# Patient Record
Sex: FEMALE | Race: WHITE | Employment: UNEMPLOYED | URBAN - METROPOLITAN AREA
[De-identification: names, ages, dates, MRNs, and addresses within clinical notes are randomized per-mention and may not be internally consistent; named-entity substitution may affect disease eponyms.]

---

## 2017-06-09 ENCOUNTER — OFFICE VISIT (OUTPATIENT)
Dept: OBGYN CLINIC | Age: 47
End: 2017-06-09

## 2017-06-09 VITALS
BODY MASS INDEX: 28.53 KG/M2 | SYSTOLIC BLOOD PRESSURE: 120 MMHG | DIASTOLIC BLOOD PRESSURE: 76 MMHG | HEIGHT: 63 IN | WEIGHT: 161 LBS

## 2017-06-09 DIAGNOSIS — R31.9 HEMATURIA: ICD-10-CM

## 2017-06-09 DIAGNOSIS — R10.2 PELVIC PRESSURE IN FEMALE: ICD-10-CM

## 2017-06-09 DIAGNOSIS — R30.0 BURNING WITH URINATION: ICD-10-CM

## 2017-06-09 DIAGNOSIS — Z01.411 ENCOUNTER FOR GYNECOLOGICAL EXAMINATION (GENERAL) (ROUTINE) WITH ABNORMAL FINDINGS: Primary | ICD-10-CM

## 2017-06-09 LAB
BILIRUB UR QL STRIP: NEGATIVE
GLUCOSE UR-MCNC: NEGATIVE MG/DL
KETONES P FAST UR STRIP-MCNC: NORMAL MG/DL
PH UR STRIP: 4.6 [PH] (ref 4.6–8)
PROT UR QL STRIP: NEGATIVE MG/DL
SP GR UR STRIP: 1 (ref 1–1.03)
UA UROBILINOGEN AMB POC: NORMAL (ref 0.2–1)
URINALYSIS CLARITY POC: CLEAR
URINALYSIS COLOR POC: NORMAL
URINE BLOOD POC: NORMAL
URINE LEUKOCYTES POC: NEGATIVE
URINE NITRITES POC: NEGATIVE

## 2017-06-09 RX ORDER — SULFAMETHOXAZOLE AND TRIMETHOPRIM 800; 160 MG/1; MG/1
1 TABLET ORAL 2 TIMES DAILY
Qty: 10 TAB | Refills: 0 | Status: SHIPPED | OUTPATIENT
Start: 2017-06-09 | End: 2017-06-14

## 2017-06-09 RX ORDER — ETONOGESTREL AND ETHINYL ESTRADIOL 11.7; 2.7 MG/1; MG/1
INSERT, EXTENDED RELEASE VAGINAL
Qty: 3 DEVICE | Refills: 4 | Status: SHIPPED | OUTPATIENT
Start: 2017-06-09 | End: 2017-12-07 | Stop reason: SDUPTHER

## 2017-06-09 NOTE — PATIENT INSTRUCTIONS

## 2017-06-09 NOTE — MR AVS SNAPSHOT
Visit Information Date & Time Provider Department Dept. Phone Encounter #  
 6/9/2017  8:40 AM Roxy Lomas MD [facility] [phone] [id number] Follow-up Instructions Return in about 1 year (around 6/9/2018), or if symptoms worsen or fail to improve, for Annual exam. Upcoming Health Maintenance Date Due  
 BREAST CANCER SCRN MAMMOGRAM 8/5/2017 INFLUENZA AGE 9 TO ADULT 8/1/2017 PAP AKA CERVICAL CYTOLOGY 7/28/2018 Allergies as of 6/9/2017  Review Complete On: 6/9/2017 By: Caryl Rinne, LPN No Known Allergies Current Immunizations  Never Reviewed No immunizations on file. Not reviewed this visit You Were Diagnosed With   
  
 Codes Comments Hematuria    -  Primary ICD-10-CM: R31.9 ICD-9-CM: 599.70 Vitals BP Height(growth percentile) Weight(growth percentile) LMP BMI OB Status 120/76 5' 3\" (1.6 m) 161 lb (73 kg) 05/14/2017 (Within Days) 28.52 kg/m2 Having regular periods Smoking Status Never Smoker BMI and BSA Data Body Mass Index Body Surface Area 28.52 kg/m 2 1.8 m 2 Preferred Pharmacy Pharmacy Name Phone [id number] [facility], [address] [phone] Your Updated Medication List  
  
   
This list is accurate as of: 6/9/17  9:15 AM.  Always use your most recent med list.  
  
  
  
  
 * ethinyl estradiol-etonogestrel 0.12-0.015 mg/24 hr vaginal ring Commonly known as:  Vertis Cleverly Insert vaginally for 3 weeks;remove for 1 week. * ethinyl estradiol-etonogestrel 0.12-0.015 mg/24 hr vaginal ring Commonly known as:  Vertis Cleverly Insert 1 ring per vagina, leave in for 3 weeks, remove ring and be ring-free for one week and repeat. MULTIVITAMIN PO Take  by mouth. trimethoprim-sulfamethoxazole 160-800 mg per tablet Commonly known as:  BACTRIM DS Take 1 Tab by mouth two (2) times a day for 5 days. * Notice: This list has 2 medication(s) that are the same as other medications prescribed for you. Read the directions carefully, and ask your doctor or other care provider to review them with you. Prescriptions Sent to Pharmacy Refills  
 trimethoprim-sulfamethoxazole (BACTRIM DS) 160-800 mg per tablet 0 Sig: Take 1 Tab by mouth two (2) times a day for 5 days. Class: Normal  
 Pharmacy: [address] [place] [address] Road  #: [phone] Route: Oral  
  
Follow-up Instructions Return in about 1 year (around 6/9/2018), or if symptoms worsen or fail to improve, for Annual exam.  
  
  
Patient Instructions Well Visit, Ages 25 to 48: Care Instructions Your Care Instructions Physical exams can help you stay healthy. Your doctor has checked your overall health and may have suggested ways to take good care of yourself. He or she also may have recommended tests. At home, you can help prevent illness with healthy eating, regular exercise, and other steps. Follow-up care is a key part of your treatment and safety. Be sure to make and go to all appointments, and call your doctor if you are having problems. It's also a good idea to know your test results and keep a list of the medicines you take. How can you care for yourself at home? · Reach and stay at a healthy weight. This will lower your risk for many problems, such as obesity, diabetes, heart disease, and high blood pressure. · Get at least 30 minutes of physical activity on most days of the week. Walking is a good choice. You also may want to do other activities, such as running, swimming, cycling, or playing tennis or team sports. Discuss any changes in your exercise program with your doctor. · Do not smoke or allow others to smoke around you. If you need help quitting, talk to your doctor about stop-smoking programs and medicines. These can increase your chances of quitting for good. · Talk to your doctor about whether you have any risk factors for sexually transmitted infections (STIs). Having one sex partner (who does not have STIs and does not have sex with anyone else) is a good way to avoid these infections. · Use birth control if you do not want to have children at this time. Talk with your doctor about the choices available and what might be best for you. · Protect your skin from too much sun. When you're outdoors from 10 a.m. to 4 p.m., stay in the shade or cover up with clothing and a hat with a wide brim. Wear sunglasses that block UV rays. Even when it's cloudy, put broad-spectrum sunscreen (SPF 30 or higher) on any exposed skin. · See a dentist one or two times a year for checkups and to have your teeth cleaned. · Wear a seat belt in the car. · Drink alcohol in moderation, if at all. That means no more than 2 drinks a day for men and 1 drink a day for women. Follow your doctor's advice about when to have certain tests. These tests can spot problems early. For everyone · Cholesterol. Have the fat (cholesterol) in your blood tested after age 21. Your doctor will tell you how often to have this done based on your age, family history, or other things that can increase your risk for heart disease. · Blood pressure. Have your blood pressure checked during a routine doctor visit. Your doctor will tell you how often to check your blood pressure based on your age, your blood pressure results, and other factors. · Vision. Talk with your doctor about how often to have a glaucoma test. 
· Diabetes. Ask your doctor whether you should have tests for diabetes. · Colon cancer. Have a test for colon cancer at age 48. You may have one of several tests. If you are younger than 48, you may need a test earlier if you have any risk factors.  Risk factors include whether you already had a precancerous polyp removed from your colon or whether your parent, brother, sister, or child has had colon cancer. For women · Breast exam and mammogram. Talk to your doctor about when you should have a clinical breast exam and a mammogram. Medical experts differ on whether and how often women under 50 should have these tests. Your doctor can help you decide what is right for you. · Pap test and pelvic exam. Begin Pap tests at age 24. A Pap test is the best way to find cervical cancer. The test often is part of a pelvic exam. Ask how often to have this test. 
· Tests for sexually transmitted infections (STIs). Ask whether you should have tests for STIs. You may be at risk if you have sex with more than one person, especially if your partners do not wear condoms. For men · Tests for sexually transmitted infections (STIs). Ask whether you should have tests for STIs. You may be at risk if you have sex with more than one person, especially if you do not wear a condom. · Testicular cancer exam. Ask your doctor whether you should check your testicles regularly. · Prostate exam. Talk to your doctor about whether you should have a blood test (called a PSA test) for prostate cancer. Experts differ on whether and when men should have this test. Some experts suggest it if you are older than 39 and are -American or have a father or brother who got prostate cancer when he was younger than 72. When should you call for help? Watch closely for changes in your health, and be sure to contact your doctor if you have any problems or symptoms that concern you. Where can you learn more? Go to http://zohaib-chad.info/. Enter P072 in the search box to learn more about \"Well Visit, Ages 25 to 48: Care Instructions. \" Current as of: July 19, 2016 Content Version: 11.2 © 7876-3248 [employer].  Care instructions adapted under license by [employer] (which disclaims liability or warranty for this information). If you have questions about a medical condition or this instruction, always ask your healthcare professional. [address] any warranty or liability for your use of this information. Introducing [facility]! Dear Al Arzate: Thank you for requesting a [employer] account. Our records indicate that you already have an active [employer] account. You can access your account anytime at [email or web address] Did you know that you can access your hospital and ER discharge instructions at any time in [employer]? You can also review all of your test results from your hospital stay or ER visit. Additional Information If you have questions, please visit the Frequently Asked Questions section of the [employer] website at [email or web address] Remember, [employer] is NOT to be used for urgent needs. For medical emergencies, dial 911. Now available from your iPhone and Android! Please provide this summary of care documentation to your next provider. Your primary care clinician is listed as Yanira Lagunas. If you have any questions after today's visit, please call [phone].

## 2017-06-09 NOTE — PROGRESS NOTES
[facility]  [email or web address]  [phone]    Shital Jean MD, [address] [facility]       Annual Gynecologic Exam  WWE 42-59  Chief Complaint   Patient presents with    Well Woman    Urinary Pain       Micha Tello is a 48 y.o.  WHITE OR   female who presents for an annual exam.  Patient's last menstrual period was 2017 (within days). .    She does report additional concerns today. Patient reports having urinary frequency, pelvic pressure, burning with urination, and leakage of urine X2 days. Patient also reports weakness, sinus trouble, cough, back pain, and pain with IC. History of Present Illness: The patient is reporting having dysuria and pelvic pressure  for 2 days. She reports the symptoms are is unchanged. Aggravating factors include none. And alleviating factors include none. History of Present Illness: The patient is reporting having dyspareunia for several months. She reports the symptoms are is unchanged. Aggravating factors include intercourse. And alleviating factors include none. Menstrual status:  Her periods are light. She does not report dysmenorrhea/painful menses. She does not report irregular bleeding. Sexual history and Contraception:  History   Sexual Activity    Sexual activity: Yes    Partners: Male    Birth control/ protection: Inserts     She never uses condoms with sexual activity. She does not reports new sexual partner(s) in the last year. The patient does not request STD testing. Preventive Medicine History:  Her last annual GYN exam was about one year ago. Her most recent Pap smear result: normal was obtained in 2015. Her most recent HR HPV screen was Negative obtained 1 year(s) ago. She does not have a history of CHARISMA 2, 3 or cervical cancer. Breast health:  Last mammogram: approximate date 14 and was abnormal.  A mammogram was not scheduled for today.   Breast cancer family updated: see FH. Bone health: Elizabeth Pretty She does not have a history of osteopenia/osteoporosis. Osteoporosis family history updated: see FH. Past Medical History:   Diagnosis Date    Abnormal mammogram 2016    MMG birads 3/cysts. - 6 month follow up needed    Abnormal Pap smear     Depressive disorder     History of bladder infections     History of mammogram 07/28/15    Small neodensity right breast for which diagnostic mammogram recommended. Hammad    PROM @ 29 wks-2#;? abruption;breech    Pap smear for cervical cancer screening 2010; 7/28/15    normal; Negative     OB History    Para Term  AB SAB TAB Ectopic Multiple Living   5 4 3 1 1 1    3      # Outcome Date GA Lbr Adam/2nd Weight Sex Delivery Anes PTL Lv   5 Term 11 40w0d  9 lb 3 oz (4.167 kg) F  UN  N Y      Birth Comments: System Generated. Please review and update pregnancy details. 4 Term 10/17/08 39w0d  7 lb 3 oz (3.26 kg) M  LO  N Y      Birth Comments: Refused FLMm at 40 weeks   3 Term 03/10/86 40w0d  7 lb 9 oz (3.43 kg) F  LO  N Y   2  85 28w0d  1 lb 2 oz (0.51 kg) M LOW VERTICAL  Y ND      Birth Comments: Low vertical with myometrial extension   1 SAB                   Past Surgical History:   Procedure Laterality Date    HX  SECTION  1985    x3-1st low vert/ext to myometrium; 8747;4363    HX OTHER SURGICAL      to cx     Family History   Problem Relation Age of Onset    No Known Problems Mother     No Known Problems Father      Social History     Social History    Marital status:      Spouse name: N/A    Number of children: N/A    Years of education: N/A     Occupational History    Not on file.      Social History Main Topics    Smoking status: Never Smoker    Smokeless tobacco: Never Used    Alcohol use No    Drug use: No    Sexual activity: Yes     Partners: Male     Birth control/ protection: Inserts     Other Topics Concern    Not on file     Social History Narrative       No Known Allergies    Current Outpatient Prescriptions   Medication Sig    MULTIVITAMIN PO Take  by mouth.  ethinyl estradiol-etonogestrel (NUVARING) 0.12-0.015 mg/24 hr vaginal ring Insert 1 ring per vagina, leave in for 3 weeks, remove ring and be ring-free for one week and repeat.  ethinyl estradiol-etonogestrel (NUVARING) 0.12-0.015 mg/24 hr vaginal ring Insert vaginally for 3 weeks;remove for 1 week.  trimethoprim-sulfamethoxazole (BACTRIM DS) 160-800 mg per tablet Take 1 Tab by mouth two (2) times a day for 5 days. No current facility-administered medications for this visit.         Patient Active Problem List   Diagnosis Code   (none) - all problems resolved or deleted       Review of Systems - History obtained from the patient  Constitutional: see hpi  HEENT: see hpi  CV: negative for chest pain, palpitations, edema  Resp: negative for cough, shortness of breath, wheezing  GI: negative for change in bowel habits, abdominal pain, black or bloody stools  : see hpi  MSK: negative for back pain, joint pain, muscle pain  Breast: negative for breast lumps, nipple discharge, galactorrhea  Skin :negative for itching, rash, hives  Neuro: see hpi  Psych: negative for anxiety, depression, change in mood  Heme/lymph: negative for bleeding, bruising, pallor    Physical Exam  Visit Vitals    /76    Ht 5' 3\" (1.6 m)    Wt 161 lb (73 kg)    LMP 05/14/2017 (Within Days)    BMI 28.52 kg/m2       Constitutional  · Appearance: well-nourished, well developed, alert, in no acute distress    HENT  · Head and Face: appears normal    Neck  · Inspection/Palpation: normal appearance, no masses or tenderness  · Lymph Nodes: no lymphadenopathy present  · Thyroid: gland size normal, nontender, no nodules or masses present on palpation    Chest  · Respiratory Effort: breathing labored  · Auscultation: normal breath sounds    Cardiovascular  · Heart:  · Auscultation: regular rate and rhythm without murmur    Breasts  · Inspection of Breasts: breasts symmetrical, no skin changes, no discharge present, nipple appearance normal, no skin retraction present  · Palpation of Breasts and Axillae: no masses present on palpation, no breast tenderness  · Axillary Lymph Nodes: no lymphadenopathy present    Gastrointestinal  · Abdominal Examination: abdomen non-tender to palpation, normal bowel sounds, no masses present  · Liver and spleen: no hepatomegaly present, spleen not palpable  · Hernias: no hernias identified    Genitourinary  · External Genitalia: normal appearance for age, no discharge present, no tenderness present, no inflammatory lesions present, no masses present, no atrophy present  · Vagina: normal vaginal vault without central or paravaginal defects, no discharge present, no inflammatory lesions present, no masses present  · Bladder: non-tender to palpation  · Urethra: appears normal  · Cervix: normal   · Uterus: normal size, shape and consistency  · Adnexa: no adnexal tenderness present, no adnexal masses present  · Perineum: perineum within normal limits, no evidence of trauma, no rashes or skin lesions present  · Anus: anus within normal limits, no hemorrhoids present  · Inguinal Lymph Nodes: no lymphadenopathy present    Skin  · General Inspection: no rash, no lesions identified    Neurologic/Psychiatric  · Mental Status:  · Orientation: grossly oriented to person, place and time  · Mood and Affect: mood normal, affect appropriate    Assessment:  48 y.o. G5 [id number] for well woman exam  Encounter Diagnoses   Name Primary?     Hematuria     Pelvic pressure in female     Burning with urination     Encounter for gynecological examination (general) (routine) with abnormal findings Yes   Dx: new problem: urinary to this provider that is moderate, data reviewed: none;  work up planned: ua/ ur cx, intervention: abx        Plan:  The patient was counseled about diet, exercise, healthy lifestyle  We discussed current self breast exam and mammogram recommendations  We discussed current pap smear and HR HPV testing guidelines  We recommend follow up in one year for routine annual gynecologic exam or sooner if needed  We recommend follow up with a primary care physician for any chronic medical problems or non-gynecologic concerns    We discussed calcium/vitamin D/weight bearing exercise and osteoporosis prevention  Handouts were given to the patient  Discussed risks, benefits and alternatives of ring/OCP: including but not limited to dvt/pe/mi/cva/ca/gi risks. We discussed progesterone only and non hormonal options for contraception including but not limited to condoms, IUDs, Nexplanon, and depo provera. PT wants to continue ring, was interested in IUD, but not covered by insurance. We discussed typical perimenopausal bleeding patterns and symtpoms. I recommend that she notify MD for chaotic or symptomatic bleeding, or bleeding in between menses or intermenstrual bleeding for additional evaluation.       UTI/pyelo precautions  Rx sent  [place] fluids  Disc other causes of hematuria, including benign causes and kidney stones    Discussed avoiding painful intercourse, generous lubrication and vaginal moisturiser for at lest three months  FU three months if NI      Folllow up:  [x] return for annual well woman exam in one year or sooner if she is having problems  [] follow up and ultrasound  [x] mammogram  [] 6 months  [] 3 months  [] 1 month    Orders Placed This Encounter    CULTURE, URINE    URINALYSIS W/MICROSCOPIC    AMB POC URINALYSIS DIP STICK MANUAL W/O MICRO    trimethoprim-sulfamethoxazole (BACTRIM DS) 160-800 mg per tablet    ethinyl estradiol-etonogestrel (NUVARING) 0.12-0.015 mg/24 hr vaginal ring       Results for orders placed or performed in visit on 06/09/17   AMB POC URINALYSIS DIP STICK MANUAL W/O MICRO   Result Value Ref Range    Color (UA POC) Sarah     Clarity (UA POC) Clear     Glucose (UA POC) Negative Negative    Bilirubin (UA POC) Negative Negative    Ketones (UA POC) Trace Negative    Specific gravity (UA POC) 1.001 1.001 - 1.035    Blood (UA POC) 2+ Negative    pH (UA POC) 4.6 4.6 - 8.0    Protein (UA POC) Negative Negative mg/dL    Urobilinogen (UA POC) normal 0.2 - 1    Nitrites (UA POC) Negative Negative    Leukocyte esterase (UA POC) Negative Negative

## 2017-06-10 LAB
APPEARANCE UR: CLEAR
BACTERIA #/AREA URNS HPF: NORMAL /[HPF]
BILIRUB UR QL STRIP: NEGATIVE
CASTS URNS QL MICRO: NORMAL /LPF
COLOR UR: YELLOW
EPI CELLS #/AREA URNS HPF: NORMAL /HPF
GLUCOSE UR QL: NEGATIVE
HGB UR QL STRIP: NEGATIVE
KETONES UR QL STRIP: NEGATIVE
LEUKOCYTE ESTERASE UR QL STRIP: NEGATIVE
MICRO URNS: NORMAL
MICRO URNS: NORMAL
MUCOUS THREADS URNS QL MICRO: PRESENT
NITRITE UR QL STRIP: NEGATIVE
PH UR STRIP: 6 [PH] (ref 5–7.5)
PROT UR QL STRIP: NEGATIVE
RBC #/AREA URNS HPF: NORMAL /HPF
SP GR UR: 1.01 (ref 1–1.03)
UROBILINOGEN UR STRIP-MCNC: 0.2 MG/DL (ref 0.2–1)
WBC #/AREA URNS HPF: NORMAL /HPF

## 2017-06-11 LAB — BACTERIA UR CULT: NO GROWTH

## 2017-06-12 NOTE — PROGRESS NOTES
Patient notified of results and MD recommendations by [employer] and was Read by Sincere Rueda at 6/11/2017  7:38 PM.

## 2017-12-07 ENCOUNTER — OFFICE VISIT (OUTPATIENT)
Dept: FAMILY MEDICINE CLINIC | Age: 47
End: 2017-12-07

## 2017-12-07 VITALS
SYSTOLIC BLOOD PRESSURE: 149 MMHG | RESPIRATION RATE: 18 BRPM | BODY MASS INDEX: 30.65 KG/M2 | HEART RATE: 87 BPM | OXYGEN SATURATION: 98 % | TEMPERATURE: 97.7 F | DIASTOLIC BLOOD PRESSURE: 83 MMHG | HEIGHT: 63 IN | WEIGHT: 173 LBS

## 2017-12-07 DIAGNOSIS — F10.10 ALCOHOL ABUSE: ICD-10-CM

## 2017-12-07 DIAGNOSIS — F33.1 MODERATE EPISODE OF RECURRENT MAJOR DEPRESSIVE DISORDER (HCC): ICD-10-CM

## 2017-12-07 DIAGNOSIS — Z00.00 ENCOUNTER FOR WELLNESS EXAMINATION IN ADULT: Primary | ICD-10-CM

## 2017-12-07 DIAGNOSIS — Z76.89 ENCOUNTER TO ESTABLISH CARE: ICD-10-CM

## 2017-12-07 DIAGNOSIS — Z13.31 DEPRESSION SCREEN: ICD-10-CM

## 2017-12-07 DIAGNOSIS — M54.41 ACUTE BILATERAL LOW BACK PAIN WITH RIGHT-SIDED SCIATICA: ICD-10-CM

## 2017-12-07 DIAGNOSIS — Z23 ENCOUNTER FOR IMMUNIZATION: ICD-10-CM

## 2017-12-07 RX ORDER — FLUOXETINE HYDROCHLORIDE 20 MG/1
20 CAPSULE ORAL DAILY
Qty: 90 CAP | Refills: 3 | Status: SHIPPED | OUTPATIENT
Start: 2017-12-07 | End: 2019-03-14

## 2017-12-07 NOTE — PROGRESS NOTES
50year old female with two complaints and annual exam    Back pain -- chronic -- weakness and numbness in her right leg -- denies fever or saddle anesthesia -- recommend referral to Orthopedics because of complaint of weakness    Depression -- hx suicide in the family; has been on zoloft, stopped it a month due to suicidal ideation; family is supportive; has been drinking 3-4 bottles of wine per week    States that she has gained 60 pounds in the last two years though this is not supported by chart review    Had abnormal mammogram in 2016 -- did not followup at 6 months as recommended    Plan:    Depression -- will refer for counseling; not currently suicidal; will supply Hot Line number; has supportive family; start prozac    Back pain -- refer to Orthopedics    Plan to see patient back in one week

## 2017-12-07 NOTE — PATIENT INSTRUCTIONS
If you or someone you know is having thoughts of suicide please go to the nearest emergency room or call [phone] ([phone]) or [phone] ([phone]) for help. Suicidal Thoughts and Behavior: Care Instructions  Your Care Instructions    You have been seen by a doctor because you've had thoughts about killing yourself. Maybe you have tried to do it. This is much different from having fleeting thoughts of death, which many people have from time to time. Your doctor and support team will work hard to help keep you safe. Your team may include a , a , and a counselor. Most people who think about suicide don't want to die. They think suicide will end their problems and pain. People who consider suicide often feel hopeless, helpless, and worthless. These thoughts can make a person feel that there is no other choice. But you do have a choice. Help is always available. The doctor and staff members are taking you and your pain very seriously. It is important to remember that there are people who are willing and able to talk with you about your suicidal thoughts. Treatment and close follow-up care can help you feel better about life. Thoughts of hopelessness and suicide may come from being depressed. Depression is a medical condition. When you have depression, there may be problems with activity levels in certain parts of your brain. Chemicals in your brain called neurotransmitters may be out of balance. But depression can be treated. Treatment for depression includes counseling, medicines, and lifestyle changes. With treatment, you can feel better. Your doctor doesn't want you to hurt yourself. He or she may ask you to sign a \"no harm\" agreement or contract. This contract is your promise that you will not hurt yourself between now and your next visit. Be completely honest with your doctor if you feel that you can't sign it. You will get help.   Follow-up care is a key part of your treatment and safety. Be sure to make and go to all appointments, and call your doctor if you are having problems. It's also a good idea to know your test results and keep a list of the medicines you take. How can you care for yourself at home? · Talk to someone. Reach out to family members, friends, your doctor, or a counselor. Be open and honest with them about your thoughts and feelings. · Be safe with medicines. Take your medicines exactly as prescribed. Call your doctor if you think you are having a problem with your medicine. · Avoid illegal drugs and alcohol. · Attend all counseling sessions recommended by your doctor. · Have someone take away sharp or dangerous objects, guns, and drugs from your home. · Keep the numbers for these national suicide hotlines: 1-089-005-TALK (1-718.901.8945) and 2-603-QJZPFMR (1-953.675.6203). When should you call for help? Call 911 anytime you think you may need emergency care. For example, call if:  ? · You feel you cannot stop from hurting yourself or someone else. ?Call your doctor now or seek immediate medical care if:  ? · You have one or more warning signs of suicide. For example, call if:  ¨ You feel like giving away your possessions. ¨ You use illegal drugs or drink alcohol heavily. ¨ You talk or write about death. This may include writing suicide notes and talking about guns, knives, or pills. ¨ You start to spend a lot of time alone or spend more time alone than usual.   ? · You hear voices. ? · You start acting in an aggressive way that's not normal for you. ? Watch closely for changes in your health, and be sure to contact your doctor if you have any problems. Where can you learn more? Go to http://zohaib-chad.info/. Enter B459 in the search box to learn more about \"Suicidal Thoughts and Behavior: Care Instructions. \"  Current as of: May 12, 2017  Content Version: 11.4  © 7689-6369 Healthwise, [facility].  Care instructions adapted under license by [employer] (which disclaims liability or warranty for this information). If you have questions about a medical condition or this instruction, always ask your healthcare professional. [place] [address] any warranty or liability for your use of this information. Adult and Child Psychiatrists and Counselors:    [facility] [phone] (intake) or walk in  [facility]. Dr. Samira Mccann, child psychiatrist  [facility] crisis services [facility] Record [phone]    [facility] Psychiatrists and [facility] Counselors [phone]    [facility] [place]) [phone]  Lalit Mtz, Ph.D and Vandana Bills    [facility] ([address] (near [facility] [phone]  Hemalatha Dockery, Ph.D., Kimberly Brenner, Ph.D.    [facility] [address] in [place]    Crow Steele [facility] [place]) [phone]  Reyna Espinoza M.D.  And [facility]. [place] [address] [place] [address]) [phone]    [address] ([facility]) [phone]    [facility] ([facility]) [phone]  Alayna Lennon M.D.

## 2017-12-07 NOTE — MR AVS SNAPSHOT
Visit Information Date & Time Provider Department Dept. Phone Encounter #  
 12/7/2017  1:00 PM Simi XBrie 3 César Phillip, [facility] [phone] [id number] Follow-up Instructions Return in about 1 week (around 12/14/2017). Upcoming Health Maintenance Date Due DTaP/Tdap/Td series (1 - Tdap) 7/9/1989 Influenza Age 5 to Adult 8/1/2017 BREAST CANCER SCRN MAMMOGRAM 8/5/2017 PAP AKA CERVICAL CYTOLOGY 7/28/2018 Allergies as of 12/7/2017  Review Complete On: 12/7/2017 By: Judith Burgos LPN No Known Allergies Current Immunizations  Never Reviewed No immunizations on file. Not reviewed this visit You Were Diagnosed With   
  
 Codes Comments Encounter for wellness examination in adult    -  Primary ICD-10-CM: Z00.00 ICD-9-CM: V70.0 Encounter to establish care     ICD-10-CM: Z76.89 
ICD-9-CM: V65.8 Depression screen     ICD-10-CM: Z13.89 ICD-9-CM: V79.0 Alcohol abuse     ICD-10-CM: F10.10 ICD-9-CM: 305.00 Moderate episode of recurrent major depressive disorder (HCC)     ICD-10-CM: F33.1 ICD-9-CM: 296.32 BMI 36.0-36.9,adult     ICD-10-CM: L87.02 
ICD-9-CM: V85.36 Acute bilateral low back pain with right-sided sciatica     ICD-10-CM: M54.41 
ICD-9-CM: 724.2, 724.3, 338.19 Vitals BP Pulse Temp Resp Height(growth percentile) Weight(growth percentile) 149/83 (BP 1 Location: Right arm, BP Patient Position: Sitting) 87 97.7 °F (36.5 °C) (Oral) 18 5' 3\" (1.6 m) 173 lb (78.5 kg) LMP SpO2 BMI OB Status Smoking Status 11/10/2017 98% 30.65 kg/m2 Having regular periods Never Smoker BMI and BSA Data Body Mass Index Body Surface Area  
 30.65 kg/m 2 1.87 m 2 Preferred Pharmacy Pharmacy Name Phone [id number] [place], [address] [phone] Your Updated Medication List  
  
   
This list is accurate as of: 12/7/17  2:19 PM.  Always use your most recent med list.  
  
  
  
  
 ethinyl estradiol-etonogestrel 0.12-0.015 mg/24 hr vaginal ring Commonly known as:  Osmar Billy Insert vaginally for 3 weeks;remove for 1 week. FLUoxetine 20 mg capsule Commonly known as:  PROzac Take 1 Cap by mouth daily. Prescriptions Sent to Pharmacy Refills FLUoxetine (PROZAC) 20 mg capsule 3 Sig: Take 1 Cap by mouth daily. Class: Normal  
 Pharmacy: [address] [place] [address] Ph #: [phone] Route: Oral  
  
We Performed the Following 10-PANEL URINE DRUG SCREEN [[id number] Custom] CBC W/O DIFF [58187 CPT(R)] HEMOGLOBIN A1C WITH EAG [86020 CPT(R)] LIPID PANEL [83989 CPT(R)] METABOLIC PANEL, COMPREHENSIVE [32918 CPT(R)] WI PATIENT SCREENED FOR DEPRESSION [1220F CPT(R)] REFERRAL TO ORTHOPEDIC SURGERY [REF62 Custom] REFERRAL TO PHYSICAL THERAPY [NKS51 Custom] REFERRAL TO PSYCHIATRY [REF91 Custom] REFERRAL TO PSYCHOLOGY [PCY93 Custom] SED RATE (ESR) G271350 CPT(R)] TSH RFX ON ABNORMAL TO FREE T4 [[id number] Custom] Follow-up Instructions Return in about 1 week (around 12/14/2017). To-Do List   
 12/07/2017 Imaging:  GANESH MAMMO BI SCREENING INCL CAD Referral Information Referral ID Referred By Referred To  
  
 [id number]  Uli Not Available Visits Status Start Date End Date 1 New Request 12/7/17 12/7/18 If your referral has a status of pending review or denied, additional information will be sent to support the outcome of this decision. Referral ID Referred By Referred To  
 [id number] Uday LE MD  
   [address] [place] [address] Edin Molina, [address] Phone: [phone] Fax: [phone] Visits Status Start Date End Date 1 New Request 12/7/17 12/7/18  If your referral has a status of pending review or denied, additional information will be sent to support the outcome of this decision. Referral ID Referred By Referred To  
 [id number] Robyn Perez [address] [facility],[address] Floor [address], Dustin Lind Phone: [phone] Visits Status Start Date End Date 1 New Request 12/7/17 12/7/18 If your referral has a status of pending review or denied, additional information will be sent to support the outcome of this decision. Referral ID Referred By Referred To  
 [id number] Robyn Perez Not Available Visits Status Start Date End Date 1 New Request 12/7/17 12/7/18 If your referral has a status of pending review or denied, additional information will be sent to support the outcome of this decision. Patient Instructions If you or someone you know is having thoughts of suicide please go to the nearest emergency room or call [phone] ([phone]) or [phone] ([phone]) for help. Suicidal Thoughts and Behavior: Care Instructions Your Care Instructions You have been seen by a doctor because you've had thoughts about killing yourself. Maybe you have tried to do it. This is much different from having fleeting thoughts of death, which many people have from time to time. Your doctor and support team will work hard to help keep you safe. Your team may include a , a , and a counselor. Most people who think about suicide don't want to die. They think suicide will end their problems and pain. People who consider suicide often feel hopeless, helpless, and worthless. These thoughts can make a person feel that there is no other choice. But you do have a choice. Help is always available. The doctor and staff members are taking you and your pain very seriously. It is important to remember that there are people who are willing and able to talk with you about your suicidal thoughts.  Treatment and close follow-up care can help you feel better about life. Thoughts of hopelessness and suicide may come from being depressed. Depression is a medical condition. When you have depression, there may be problems with activity levels in certain parts of your brain. Chemicals in your brain called neurotransmitters may be out of balance. But depression can be treated. Treatment for depression includes counseling, medicines, and lifestyle changes. With treatment, you can feel better. Your doctor doesn't want you to hurt yourself. He or she may ask you to sign a \"no harm\" agreement or contract. This contract is your promise that you will not hurt yourself between now and your next visit. Be completely honest with your doctor if you feel that you can't sign it. You will get help. Follow-up care is a key part of your treatment and safety. Be sure to make and go to all appointments, and call your doctor if you are having problems. It's also a good idea to know your test results and keep a list of the medicines you take. How can you care for yourself at home? · Talk to someone. Reach out to family members, friends, your doctor, or a counselor. Be open and honest with them about your thoughts and feelings. · Be safe with medicines. Take your medicines exactly as prescribed. Call your doctor if you think you are having a problem with your medicine. · Avoid illegal drugs and alcohol. · Attend all counseling sessions recommended by your doctor. · Have someone take away sharp or dangerous objects, guns, and drugs from your home. · Keep the numbers for these national suicide hotlines: 3-178-835-TALK (5-384.473.4437) and 3-354-XZMWVVT (2-448.698.3446). When should you call for help? Call 911 anytime you think you may need emergency care. For example, call if: 
? · You feel you cannot stop from hurting yourself or someone else. ?Call your doctor now or seek immediate medical care if: ? · You have one or more warning signs of suicide. For example, call if: 
¨ You feel like giving away your possessions. ¨ You use illegal drugs or drink alcohol heavily. ¨ You talk or write about death. This may include writing suicide notes and talking about guns, knives, or pills. ¨ You start to spend a lot of time alone or spend more time alone than usual.  
? · You hear voices. ? · You start acting in an aggressive way that's not normal for you. ? Watch closely for changes in your health, and be sure to contact your doctor if you have any problems. Where can you learn more? Go to http://zohaibLivelenschad.info/. Enter X586 in the search box to learn more about \"Suicidal Thoughts and Behavior: Care Instructions. \" Current as of: May 12, 2017 Content Version: 11.4 © 8951-9372 [employer]. Care instructions adapted under license by [employer] (which disclaims liability or warranty for this information). If you have questions about a medical condition or this instruction, always ask your healthcare professional. [place] [address] any warranty or liability for your use of this information. Adult and Child Psychiatrists and Counselors: 
 
[facility]  (intake) or walk in 
[facility]. Dr. Gauri Bear, child psychiatrist 
[facility] crisis services [facility] [phone] [facility]iat[facility] and Family Counselors ([address]) [phone] [address] [place] [address]) [phone] Tamra Burrell, Ph.D and Henry Ramirez [facility] ([address] (near [place]) [phone] Fermin Stewart, Ph.D., Amita Gunter, Ph.D. 
 
[address] in [facility] [place]) [phone] Td Todd M.D. And [employer] [facility] [place] [phone] [phone] [facility] (Sabi Carson) [phone] Milan Queen M.D. Introducing [facility]! Dear Melissa Quezada: Thank you for requesting a [employer] account. Our records indicate that you already have an active [employer] account. You can access your account anytime at [email or web address] Did you know that you can access your hospital and ER discharge instructions at any time in [employer]? You can also review all of your test results from your hospital stay or ER visit. Additional Information If you have questions, please visit the Frequently Asked Questions section of the [employer] website at [email or web address] Remember, [employer] is NOT to be used for urgent needs. For medical emergencies, dial 911. Now available from your iPhone and Android! Please provide this summary of care documentation to your next provider. Your primary care clinician is listed as Simi Jackson. If you have any questions after today's visit, please call [phone].

## 2017-12-07 NOTE — PROGRESS NOTES
HPI:  Frantz Medina is a 50 y.o. female presenting for back pain, and decreased mood. She has family h/o suicide (x3) and depression; and has had depression since age 13. ( parents .) has been on zoloft, but stopped it a month ago because of suicidal ideation last month. No suicidal ideation now, no plan of suicide. Family is supportive. Jazmyn Armijo is welcoming. Depression has been worse with loss of job ( May 31st). She drinks 3-4 bottles of wine a week ( CAGE: 2/4). does not smoke or use any recreational drugs    Pt denies current suicidal and homicidal ideation.     - Back pain:  Patient presents for presents evaluation of low back problems. Symptoms have been present for several years and include pain in lower back (sharp in character; 4/10 in severity), weakness in R leg, numbness in R Leg, stiffness in the morning for 30 min. Initial inciting event: none, has DDD diagnosed at age 26 . Symptoms are constant. Alleviating factors identifiable by patient are recumbency. Exacerbating factors identifiable by patient are standing, walking, walking upstairs. Treatments so far : physical therapy ( did help) steroids shots ( temporary relief), naproxen 1-2 pills a day ( no much relief). No incontinence or saddle anesthesia. No trauma, no fever. Diet: veggies generally healthy,   Try to exercise: treadmill 2-3 times a week, used to run 3 miles a day 5-6 x/week. Gained 60 lbs in the last 2 years. ( ~ 20 lbs in 6 years on chart)  Dr. Cynthia Garcia is obgyn. Pap utd. Allergies- reviewed:   No Known Allergies      Medications- reviewed:   Current Outpatient Prescriptions   Medication Sig    FLUoxetine (PROZAC) 20 mg capsule Take 1 Cap by mouth daily.  ethinyl estradiol-etonogestrel (NUVARING) 0.12-0.015 mg/24 hr vaginal ring Insert vaginally for 3 weeks;remove for 1 week. No current facility-administered medications for this visit.           Past Medical History- reviewed:  Past Medical History: Diagnosis Date    Abnormal mammogram 2016    MMG birads 3/cysts. - 6 month follow up needed    Abnormal Pap smear     Depressive disorder     History of bladder infections     History of mammogram 07/28/15    Small neodensity right breast for which diagnostic mammogram recommended. Hammad    PROM @ 34 wks-2#;? abruption;breech    Pap smear for cervical cancer screening 2010; 7/28/15    normal; Negative         Past Surgical History- reviewed:   Past Surgical History:   Procedure Laterality Date    HX  SECTION  1985    x3-1st low vert/ext to myometrium; 2454;8757    HX OTHER SURGICAL      to cx         Family History - reviewed:  Family History   Problem Relation Age of Onset    No Known Problems Mother     No Known Problems Father          Social History - reviewed:  Social History     Social History    Marital status:      Spouse name: N/A    Number of children: N/A    Years of education: N/A     Occupational History    Not on file. Social History Main Topics    Smoking status: Never Smoker    Smokeless tobacco: Never Used    Alcohol use No    Drug use: No    Sexual activity: Yes     Partners: Male     Birth control/ protection: Inserts     Other Topics Concern    Not on file     Social History Narrative         Immunizations - reviewed: There is no immunization history on file for this patient. Flu: today  Tdap: today      Health Maintenance reviewed -  Pap smear neg in   Mammogram in  with cyst. No f/u mammogram done        Review of systems:  Items bolded if positive. Constitutional: Fever, chills, night sweats, weight loss, lymphadenopathy, fatigue  HEENT: Vision change, eye pain, rhinorrhea, sinus pain, epistaxis, dysphagia, change in hearing, tinnitus, vertigo.    Endocrine: Weight change, heat/ cold intolerance, tremor, insomnia, polyuria, polydipsia, polyphagia, abnl hair growth, nail changes  Cardiovascular: Chest pain, palpitations, syncope, lower extremity edema, orthopnea, paroxysmal nocturnal dyspnea  Pulmonary: Shortness of breath, dyspnea on exertion, cough, hemoptysis, wheezing  GI: Nausea, vomiting, diarrhea, melena, hematochezia, change in appetite, abdominal pain, change in bowel habits or stools  : Dysuria, frequency, urgency, incontinence, hematuria, nocturia  Musculoskeletal: joint swelling or pain, muscle pain, back pain  Skin:  Rash, New/growing/changing skin lesions  Neurologic: Headache, muscle weakness, paresthesias, anesthesia, ataxia, change in speech, change in gait   Psychiatric: depression, anxiety, hallucinations, alycia, SI/HI  BREASTS: No masses or nipple discharge      Physical Exam  Visit Vitals    /83 (BP 1 Location: Right arm, BP Patient Position: Sitting)    Pulse 87    Temp 97.7 °F (36.5 °C) (Oral)    Resp 18    Ht 5' 3\" (1.6 m)    Wt 173 lb (78.5 kg)    LMP 11/10/2017    SpO2 98%    BMI 30.65 kg/m2       General  no distress, well developed, well nourished  HEENT  oropharynx clear and moist mucous membranes  Eyes  PERRL, EOMI and Conjunctivae Clear Bilaterally  Neck   full range of motion and supple  Respiratory  Clear Breath Sounds Bilaterally, No Increased Effort and Good Air Movement Bilaterally  Cardiovascular   RRR, S1S2, No murmur   Abdomen  soft, non tender and non distended  Skin  No Rash and Cap Refill less than 3 sec  Musculoskeletal: antalgic gait. heel or toe walking limited due to pain. Extension of back , and Straight leg raise test on the right reproduces pain. Can squat to 90 degress, can flex back to 90 degrees.   no spinal or paraspinal tenderness and strength normal and equal bilaterally  Neurology  AAO and CN II - XII grossly intact      PHQ over the last two weeks 12/7/2017   PHQ Not Done Active Diagnosis of Depression or Bipolar Disorder   Little interest or pleasure in doing things Several days   Feeling down, depressed or hopeless More than half the days Total Score PHQ 2 3   Trouble falling or staying asleep, or sleeping too much Several days   Feeling tired or having little energy Several days   Poor appetite or overeating Several days   Feeling bad about yourself - or that you are a failure or have let yourself or your family down Several days   Trouble concentrating on things such as school, work, reading or watching TV Several days   Moving or speaking so slowly that other people could have noticed; or the opposite being so fidgety that others notice Not at all   Thoughts of being better off dead, or hurting yourself in some way Several days   PHQ 9 Score 9     Lab Results   Component Value Date/Time    TSH 2.470 07/22/2014 02:08 PM             Assessment/Plan:    ICD-10-CM ICD-9-CM    1. Encounter for wellness examination in adult Z00.00 V70.0 CBC W/O DIFF      METABOLIC PANEL, COMPREHENSIVE      LIPID PANEL      HEMOGLOBIN A1C WITH EAG      [place] PATIENT SCREENED FOR DEPRESSION      GANESH MAMMO BI SCREENING INCL CAD   2. Encounter to establish care Z76.89 V65.8    3. Depression screen Z13.89 V79.0 [place] PATIENT SCREENED FOR DEPRESSION   4. Alcohol abuse F10.10 305.00 10-PANEL URINE DRUG SCREEN      REFERRAL TO PSYCHIATRY   5. Moderate episode of recurrent major depressive disorder (HCC) F33.1 296.32 TSH RFX ON ABNORMAL TO FREE T4      REFERRAL TO PSYCHOLOGY      10-PANEL URINE DRUG SCREEN      REFERRAL TO PSYCHIATRY      FLUoxetine (PROZAC) 20 mg capsule   6. BMI 36.0-36.9,adult Z68.36 V85.36 TSH RFX ON ABNORMAL TO FREE T4   7. Acute bilateral low back pain with right-sided sciatica M54.41 724.2 REFERRAL TO PHYSICAL THERAPY     724.3 SED RATE (ESR)     338.19 REFERRAL TO ORTHOPEDIC SURGERY       1. Encounter for wellness examination in adult    - CBC W/O DIFF  - METABOLIC PANEL, COMPREHENSIVE  - LIPID PANEL  - HEMOGLOBIN A1C WITH EAG  - [place] PATIENT SCREENED FOR DEPRESSION  - GANESH MAMMO BI SCREENING INCL CAD; Future    2.  Encounter to establish care  - discussed with patient. Will follow patient. 3. Depression screen  Positive. See below  - [place] PATIENT SCREENED FOR DEPRESSION    4. Alcohol abuse  - discussed AA. Advised to subscribe and go to meetings  - 10-PANEL URINE DRUG SCREEN  - REFERRAL TO PSYCHIATRY    5. Moderate episode of recurrent major depressive disorder ([facility] [address]  - no currently suicidal or homicidal. Will follow up in one week. Referral number given. Crisis number and suicide hotline numbers given  - TSH RFX ON ABNORMAL TO FREE T4  - REFERRAL TO PSYCHOLOGY  - 10-PANEL URINE DRUG SCREEN  - REFERRAL TO PSYCHIATRY  - FLUoxetine (PROZAC) 20 mg capsule; Take 1 Cap by mouth daily. Dispense: 90 Cap; Refill: 3    6. BMI 36.0-36.9,adult  I have reviewed/discussed the above normal BMI with the patient. I have recommended the following interventions: dietary management education, guidance, and counseling and encourage exercise . [employer] - TSH RFX ON ABNORMAL TO FREE T4    7. Acute bilateral low back pain with right-sided sciatica  - advised heat pads. Hold off muscle relaxant as on SSRIs. Continue Naproxen prn  - [facility]  - SED RATE (ESR)  - REFERRAL TO ORTHOPEDIC SURGERY    · Counseled re: diet, exercise, healthy lifestyle    · Appropriate labs, vaccines, imaging studies, and referrals ordered as listed above    · Discussed the patient's BMI with her. · The patient was counseled on the dangers of tobacco use, and was advised to quit. Reviewed strategies to maximize success, including written materials. Follow-up Disposition:  Return in about 1 week (around 12/14/2017). I have discussed the diagnosis with the patient and the intended plan as seen in the above orders. Patient verbalized understanding of the plan and agrees with the plan. The patient has received an after-visit summary and questions were answered concerning future plans. I have discussed medication side effects and warnings with the patient as well.  Informed patient to return to the office if new symptoms arise. Simi Jackson MD  Family Medicine Resident

## 2018-02-08 ENCOUNTER — OFFICE VISIT (OUTPATIENT)
Dept: OBGYN CLINIC | Age: 48
End: 2018-02-08

## 2018-02-08 DIAGNOSIS — Z12.31 ENCOUNTER FOR SCREENING MAMMOGRAM FOR MALIGNANT NEOPLASM OF BREAST: Primary | ICD-10-CM

## 2018-02-09 ENCOUNTER — TELEPHONE (OUTPATIENT)
Dept: OBGYN CLINIC | Age: 48
End: 2018-02-09

## 2018-02-09 DIAGNOSIS — R92.8 ABNORMAL MAMMOGRAM OF LEFT BREAST: Primary | ICD-10-CM

## 2018-02-09 NOTE — TELEPHONE ENCOUNTER
Pt notified of results and MD recommendations. Patient verbalized understanding.  number given and advised to call if she doesn't hear from them early next week. Patient verbalized understanding. Orders placed.     ----- Message from Harika Givens MD sent at 2/8/2018 11:58 AM EST -----  Abnormal, needs follow up. Tickle. Update FS. Notify patient if not done by radiology.

## 2018-02-20 ENCOUNTER — HOSPITAL ENCOUNTER (OUTPATIENT)
Dept: MAMMOGRAPHY | Age: 48
Discharge: HOME | End: 2018-02-20
Attending: OBSTETRICS & GYNECOLOGY
Payer: COMMERCIAL

## 2018-02-20 ENCOUNTER — HOSPITAL ENCOUNTER (OUTPATIENT)
Dept: ULTRASOUND IMAGING | Age: 48
Discharge: HOME | End: 2018-02-20
Attending: STUDENT IN AN ORGANIZED HEALTH CARE EDUCATION/TRAINING PROGRAM
Payer: COMMERCIAL

## 2018-02-20 DIAGNOSIS — R92.8 ABNORMAL MAMMOGRAM OF LEFT BREAST: ICD-10-CM

## 2018-02-20 DIAGNOSIS — Z00.00 ENCOUNTER FOR WELLNESS EXAMINATION IN ADULT: ICD-10-CM

## 2018-02-20 PROCEDURE — 76642 ULTRASOUND BREAST LIMITED: CPT

## 2018-02-20 PROCEDURE — 77065 DX MAMMO INCL CAD UNI: CPT

## 2018-02-20 NOTE — PROGRESS NOTES
MMG normal.  Please update chart per 1/16 protocol. Probable benign finding. Recommend 6 month short interval  follow-up left breast diagnostic mammogram and possible ultrasound.   tickle

## 2018-05-29 ENCOUNTER — HOSPITAL ENCOUNTER (OUTPATIENT)
Dept: MRI IMAGING | Age: 48
Discharge: HOME | End: 2018-05-29
Attending: PAIN MEDICINE
Payer: COMMERCIAL

## 2018-05-29 DIAGNOSIS — M54.12 RADICULOPATHY, CERVICAL: ICD-10-CM

## 2018-05-29 PROCEDURE — 72141 MRI NECK SPINE W/O DYE: CPT

## 2018-06-11 ENCOUNTER — OFFICE VISIT (OUTPATIENT)
Dept: OBGYN CLINIC | Age: 48
End: 2018-06-11

## 2018-06-11 VITALS
BODY MASS INDEX: 28.35 KG/M2 | SYSTOLIC BLOOD PRESSURE: 110 MMHG | WEIGHT: 160 LBS | HEIGHT: 63 IN | DIASTOLIC BLOOD PRESSURE: 64 MMHG

## 2018-06-11 DIAGNOSIS — Z01.419 ENCOUNTER FOR GYNECOLOGICAL EXAMINATION (GENERAL) (ROUTINE) WITHOUT ABNORMAL FINDINGS: Primary | ICD-10-CM

## 2018-06-11 DIAGNOSIS — Z01.419 ENCOUNTER FOR GYNECOLOGICAL EXAMINATION WITHOUT ABNORMAL FINDING: ICD-10-CM

## 2018-06-11 RX ORDER — ETONOGESTREL AND ETHINYL ESTRADIOL 11.7; 2.7 MG/1; MG/1
INSERT, EXTENDED RELEASE VAGINAL
Qty: 3 DEVICE | Refills: 4 | Status: SHIPPED | OUTPATIENT
Start: 2018-06-11 | End: 2019-09-06 | Stop reason: SDUPTHER

## 2018-06-11 NOTE — MR AVS SNAPSHOT
[address] [facility] [address] [facility] 
[phone] Patient: Ge Pierce MRN: [id number] :1970 Visit Information Date & Time Provider Department Dept. Phone Encounter #  
 2018 10:10 AM MD Ysabel Sheriffmaria victoria [id number] Follow-up Instructions Return in about 1 year (around 2019), or if symptoms worsen or fail to improve, for Annual exam. Upcoming Health Maintenance Date Due  
 PAP AKA CERVICAL CYTOLOGY 2018 Influenza Age 5 to Adult 2018 BREAST CANCER SCRN MAMMOGRAM 2019 Allergies as of 2018  Review Complete On: 2018 By: Amarilis Douglas No Known Allergies Current Immunizations  Never Reviewed Name Date Influenza Vaccine (Quad) PF 2017 Tdap 2017 Not reviewed this visit You Were Diagnosed With   
  
 Codes Comments Encounter for gynecological examination (general) (routine) without abnormal findings    -  Primary ICD-10-CM: R75.968 ICD-9-CM: V72.31 Vitals BP Height(growth percentile) Weight(growth percentile) LMP BMI OB Status 110/64 5' 3\" (1.6 m) 160 lb (72.6 kg) 2018 (Exact Date) 28.34 kg/m2 Having regular periods Smoking Status Never Smoker BMI and BSA Data Body Mass Index Body Surface Area  
 28.34 kg/m 2 1.8 m 2 Preferred Pharmacy Pharmacy Name Phone [id number] [phone] [facility], [address] [phone] Your Updated Medication List  
  
   
This list is accurate as of 18 10:25 AM.  Always use your most recent med list.  
  
  
  
  
 ethinyl estradiol-etonogestrel 0.12-0.015 mg/24 hr vaginal ring Commonly known as:  Ana Chain Insert vaginally for 3 weeks;remove for 1 week. FLUoxetine 20 mg capsule Commonly known as:  PROzac Take 1 Cap by mouth daily. We Performed the Following PAP IG, HPV AND RFX HPV 39/34,62([id number]) [[id number] Custom] Follow-up Instructions Return in about 1 year (around 6/11/2019), or if symptoms worsen or fail to improve, for Annual exam.  
  
  
Patient Instructions Pap Test: Care Instructions Your Care Instructions The Pap test (also called a Pap smear) is a screening test for cancer of the cervix, which is the lower part of the uterus that opens into the vagina. The test can help your doctor find early changes in the cells that could lead to cancer. The sample of cells taken during your test has been sent to a lab so that an expert can look at the cells. It usually takes a week or two to get the results back. Follow-up care is a key part of your treatment and safety. Be sure to make and go to all appointments, and call your doctor if you are having problems. It's also a good idea to know your test results and keep a list of the medicines you take. What do the results mean? · A normal result means that the test did not find any abnormal cells in the sample. · An abnormal result can mean many things. Most of these are not cancer. The results of your test may be abnormal because: 
¨ You have an infection of the vagina or cervix, such as a yeast infection. ¨ You have an IUD (intrauterine device for birth control). ¨ You have low estrogen levels after menopause that are causing the cells to change. ¨ You have cell changes that may be a sign of precancer or cancer. The results are ranked based on how serious the changes might be. There are many other reasons why you might not get a normal result. If the results were abnormal, you may need to get another test within a few weeks or months. If the results show changes that could be a sign of cancer, you may need a test called a colposcopy, which provides a more complete view of the cervix.  
Sometimes the lab cannot use the sample because it does not contain enough cells or was not preserved well. If so, you may need to have the test again. This is not common, but it does happen from time to time. When should you call for help? Watch closely for changes in your health, and be sure to contact your doctor if: 
? · You have vaginal bleeding or pain for more than 2 days after the test. It is normal to have a small amount of bleeding for a day or two after the test.  
Where can you learn more? Go to http://zohaib-chad.info/. Enter C572 in the search box to learn more about \"Pap Test: Care Instructions. \" Current as of: May 12, 2017 Content Version: 11.4 © 2369-4742 [employer]. Care instructions adapted under license by [employer] (which disclaims liability or warranty for this information). If you have questions about a medical condition or this instruction, always ask your healthcare professional. [address] any warranty or liability for your use of this information. Introducing [facility]! Dear Samantha Hoover: Thank you for requesting a [employer] account. Our records indicate that you already have an active [employer] account. You can access your account anytime at [email or web address]. [email or web address] Noted Did you know that you can access your hospital and ER discharge instructions at any time in [employer]? You can also review all of your test results from your hospital stay or ER visit. Additional Information If you have questions, please visit the Frequently Asked Questions section of the [employer] website at [email or web address]. [email or web address]/. Remember, [employer] is NOT to be used for urgent needs. For medical emergencies, dial 911. Now available from your iPhone and Android! Please provide this summary of care documentation to your next provider. Your primary care clinician is listed as NONE. If you have any questions after today's visit, please call [phone].

## 2018-06-11 NOTE — PATIENT INSTRUCTIONS
Pap Test: Care Instructions  Your Care Instructions    The Pap test (also called a Pap smear) is a screening test for cancer of the cervix, which is the lower part of the uterus that opens into the vagina. The test can help your doctor find early changes in the cells that could lead to cancer. The sample of cells taken during your test has been sent to a lab so that an expert can look at the cells. It usually takes a week or two to get the results back. Follow-up care is a key part of your treatment and safety. Be sure to make and go to all appointments, and call your doctor if you are having problems. It's also a good idea to know your test results and keep a list of the medicines you take. What do the results mean? · A normal result means that the test did not find any abnormal cells in the sample. · An abnormal result can mean many things. Most of these are not cancer. The results of your test may be abnormal because:  ¨ You have an infection of the vagina or cervix, such as a yeast infection. ¨ You have an IUD (intrauterine device for birth control). ¨ You have low estrogen levels after menopause that are causing the cells to change. ¨ You have cell changes that may be a sign of precancer or cancer. The results are ranked based on how serious the changes might be. There are many other reasons why you might not get a normal result. If the results were abnormal, you may need to get another test within a few weeks or months. If the results show changes that could be a sign of cancer, you may need a test called a colposcopy, which provides a more complete view of the cervix. Sometimes the lab cannot use the sample because it does not contain enough cells or was not preserved well. If so, you may need to have the test again. This is not common, but it does happen from time to time. When should you call for help?   Watch closely for changes in your health, and be sure to contact your doctor if:  ? · You have vaginal bleeding or pain for more than 2 days after the test. It is normal to have a small amount of bleeding for a day or two after the test.   Where can you learn more? Go to http://zohaib-chad.info/. Enter R552 in the search box to learn more about \"Pap Test: Care Instructions. \"  Current as of: May 12, 2017  Content Version: 11.4  © 1239-7119 [employer]. Care instructions adapted under license by "[employer]" (which disclaims liability or warranty for this information). If you have questions about a medical condition or this instruction, always ask your healthcare professional. [address] any warranty or liability for your use of this information.

## 2018-06-11 NOTE — PROGRESS NOTES
[facility]  [email or web address]  [phone]    Teresa Rubin MD, [address] [facility]       Annual Gynecologic Exam  WWE   Chief Complaint   Patient presents with    Well Woman       Nkechi Melvin is a 50 y.o.  WHITE OR   female who presents for an annual exam.  Patient's last menstrual period was 2018 (exact date). .    She does not report additional concerns today. Reports weight gain after back problems and less running (spinal stenosis). Menstrual status:  Her periods are moderate. She does not report dysmenorrhea/painful menses. She does not report irregular bleeding. Sexual history and Contraception:  History   Sexual Activity    Sexual activity: Yes    Partners: Male    Birth control/ protection: Inserts       She does not reports new sexual partner(s) in the last year. The patient does not request STD testing. Preventive Medicine History:  Her most recent Pap smear result: ASCUS was obtained in 2014    Her most recent HR HPV screen was Negative obtained in     She does not have a history of CHARISMA 2, 3 or cervical cancer. Breast health:  Last mammogram: was abnormal: 2018 and breast ultrasound WNL. A mammogram was not scheduled for today. Breast cancer family updated: see FH. Bone health: [place] She does not have a history of osteopenia/osteoporosis. Osteoporosis family history updated: see . Past Medical History:   Diagnosis Date    Abnormal mammogram 2016    MMG birads 3/cysts. - 6 month follow up needed    Abnormal mammogram of left breast 2018; 18    Left breast retroareolar focal asymmetry; normal, rec 6 month f/u    Abnormal Pap smear     Depressive disorder     History of bladder infections     History of mammogram 07/28/15    Small neodensity right breast for which diagnostic mammogram recommended.    Hammad    PROM @ 29 wks-2#;? abruption;breech    Pap smear for cervical cancer screening 2010; 7/28/15    normal; Negative     OB History    Para Term  AB Living   5 4 3 1 1 3   SAB TAB Ectopic Molar Multiple Live Births   1     4      # Outcome Date GA Lbr Adam/2nd Weight Sex Delivery Anes PTL Lv   5 Term 11 40w0d  9 lb 3 oz (4.167 kg) F  UN  N WEST      Birth Comments: System Generated. Please review and update pregnancy details. 4 Term 10/17/08 39w0d  7 lb 3 oz (3.26 kg) M  LO  N WEST      Birth Comments: Refused FLMm at 40 weeks   3 Term 03/10/86 40w0d  7 lb 9 oz (3.43 kg) F  LO  N WEST   2  85 28w0d  1 lb 2 oz (0.51 kg) M LOW VERTICAL  Y ND      Birth Comments: Low vertical with myometrial extension   1 SAB                   Past Surgical History:   Procedure Laterality Date    HX  SECTION  1985    x3-1st low vert/ext to myometrium; 8284;9668    HX OTHER SURGICAL      to cx     Family History   Problem Relation Age of Onset    No Known Problems Mother     No Known Problems Father      Social History     Social History    Marital status:      Spouse name: N/A    Number of children: N/A    Years of education: N/A     Occupational History    Not on file. Social History Main Topics    Smoking status: Never Smoker    Smokeless tobacco: Never Used    Alcohol use No    Drug use: No    Sexual activity: Yes     Partners: Male     Birth control/ protection: Inserts     Other Topics Concern    Not on file     Social History Narrative       No Known Allergies    Current Outpatient Prescriptions   Medication Sig    ethinyl estradiol-etonogestrel (NUVARING) 0.12-0.015 mg/24 hr vaginal ring Insert vaginally for 3 weeks;remove for 1 week.  FLUoxetine (PROZAC) 20 mg capsule Take 1 Cap by mouth daily. No current facility-administered medications for this visit.         Patient Active Problem List   Diagnosis Code   (none) - all problems resolved or deleted       Review of Systems - History obtained from the patient  Constitutional: see HPI  HEENT: negative for hearing loss, earache, congestion, snoring, sorethroat  CV: negative for chest pain, palpitations, edema  Resp: negative for cough, shortness of breath, wheezing  GI: negative for change in bowel habits, abdominal pain, black or bloody stools  : negative for frequency, dysuria, hematuria, vaginal discharge  MSK: negative for back pain, joint pain, muscle pain  Breast: negative for breast lumps, nipple discharge, galactorrhea  Skin :negative for itching, rash, hives  Neuro: negative for dizziness, headache, confusion, weakness  Psych: negative for anxiety, depression, change in mood  Heme/lymph: negative for bleeding, bruising, pallor    Physical Exam  Visit Vitals    /64    Ht 5' 3\" (1.6 m)    Wt 160 lb (72.6 kg)    LMP 05/21/2018 (Exact Date)    BMI 28.34 kg/m2       Constitutional  · Appearance: well-nourished, well developed, alert, in no acute distress    HENT  · Head and Face: appears normal    Neck  · Inspection/Palpation: normal appearance, no masses or tenderness  · Lymph Nodes: no lymphadenopathy present  · Thyroid: gland size normal, nontender, no nodules or masses present on palpation    Chest  · Respiratory Effort: breathing unlabored  · Auscultation: normal breath sounds    Cardiovascular  · Heart:  · Auscultation: regular rate and rhythm without murmur    Breasts  · Inspection of Breasts: breasts symmetrical, no skin changes, no discharge present, nipple appearance normal, no skin retraction present  · Palpation of Breasts and Axillae: no masses present on palpation, no breast tenderness  · Axillary Lymph Nodes: no lymphadenopathy present    Gastrointestinal  · Abdominal Examination: abdomen non-tender to palpation, normal bowel sounds, no masses present  · Liver and spleen: no hepatomegaly present, spleen not palpable  · Hernias: no hernias identified    Genitourinary  · External Genitalia: normal appearance for age, no discharge present, no tenderness present, no inflammatory lesions present, no masses present, without atrophy present  · Vagina: normal vaginal vault without central or paravaginal defects, no discharge present, no inflammatory lesions present, no masses present  · Bladder: non-tender to palpation  · Urethra: appears normal  · Cervix: normal Friable with pap  · Uterus: normal size, shape and consistency  · Adnexa: no adnexal tenderness present, no adnexal masses present  · Perineum: perineum within normal limits, no evidence of trauma, no rashes or skin lesions present  · Anus: anus within normal limits, no hemorrhoids present  · Inguinal Lymph Nodes: no lymphadenopathy present    Skin  · General Inspection: no rash, no lesions identified    Neurologic/Psychiatric  · Mental Status:  · Orientation: grossly oriented to person, place and time  · Mood and Affect: mood normal, affect appropriate    Assessment:  50 y.o. G5 [id number] for well woman exam  Encounter Diagnoses   Name Primary?  Encounter for gynecological examination (general) (routine) without abnormal findings Yes    Encounter for gynecological examination without abnormal finding        Plan:  The patient was counseled about diet, exercise, healthy lifestyle  We discussed current self breast exam and mammogram recommendations  We discussed current pap smear and HR HPV testing guidelines  We recommend follow up in one year for routine annual gynecologic exam or sooner if needed  We recommend follow up with a primary care physician for any chronic medical problems or non-gynecologic concerns    We discussed calcium/vitamin D/weight bearing exercise and osteoporosis prevention  Handouts were given to the patient  Discussed risks, benefits and alternatives of OCP/nuvaring/patch: including but not limited to dvt/pe/mi/cva/ca/gi risks. We discussed typical perimenopausal bleeding patterns and symptoms.   I recommend that she notify MD for chaotic or symptomatic bleeding, or bleeding in between menses or intermenstrual bleeding for additional evaluation. She can also schedule a consult to discuss management of symptoms of perimenopause that are concerning her or interfering with her life or day to day function or activity. Folllow up:  [x] return for annual well woman exam in one year or sooner if she is having problems  [] follow up and ultrasound  [x] mammogram  [] 6 months  [] 3 months  [] 1 month    Orders Placed This Encounter    ethinyl estradiol-etonogestrel (NUVARING) 0.12-0.015 mg/24 hr vaginal ring    PAP IG, HPV AND RFX HPV 53/25,25([id number])       No results found for any visits on 06/11/18.

## 2018-06-13 LAB
CYTOLOGIST CVX/VAG CYTO: NORMAL
CYTOLOGY CVX/VAG DOC THIN PREP: NORMAL
CYTOLOGY HISTORY:: NORMAL
DX ICD CODE: NORMAL
HPV I/H RISK 1 DNA CVX QL PROBE+SIG AMP: NEGATIVE
Lab: NORMAL
OTHER STN SPEC: NORMAL
PATH REPORT.FINAL DX SPEC: NORMAL
STAT OF ADQ CVX/VAG CYTO-IMP: NORMAL

## 2019-01-28 ENCOUNTER — HOSPITAL ENCOUNTER (EMERGENCY)
Age: 49
Discharge: HOME | End: 2019-01-28
Attending: EMERGENCY MEDICINE
Payer: COMMERCIAL

## 2019-01-28 ENCOUNTER — OFFICE VISIT (OUTPATIENT)
Dept: FAMILY MEDICINE CLINIC | Age: 49
End: 2019-01-28

## 2019-01-28 ENCOUNTER — APPOINTMENT (OUTPATIENT)
Dept: GENERAL RADIOLOGY | Age: 49
End: 2019-01-28
Attending: EMERGENCY MEDICINE
Payer: COMMERCIAL

## 2019-01-28 VITALS
WEIGHT: 156 LBS | HEART RATE: 91 BPM | BODY MASS INDEX: 27.64 KG/M2 | SYSTOLIC BLOOD PRESSURE: 119 MMHG | HEIGHT: 63 IN | RESPIRATION RATE: 17 BRPM | TEMPERATURE: 98 F | DIASTOLIC BLOOD PRESSURE: 87 MMHG | OXYGEN SATURATION: 97 %

## 2019-01-28 VITALS
OXYGEN SATURATION: 96 % | HEIGHT: 63 IN | WEIGHT: 160 LBS | TEMPERATURE: 98.7 F | SYSTOLIC BLOOD PRESSURE: 147 MMHG | HEART RATE: 49 BPM | BODY MASS INDEX: 28.35 KG/M2 | DIASTOLIC BLOOD PRESSURE: 81 MMHG | RESPIRATION RATE: 16 BRPM

## 2019-01-28 DIAGNOSIS — I49.3 PVC (PREMATURE VENTRICULAR CONTRACTION): ICD-10-CM

## 2019-01-28 DIAGNOSIS — R07.89 ATYPICAL CHEST PAIN: ICD-10-CM

## 2019-01-28 DIAGNOSIS — I49.1 PAC (PREMATURE ATRIAL CONTRACTION): Primary | ICD-10-CM

## 2019-01-28 DIAGNOSIS — R00.1 BRADYCARDIA: Primary | ICD-10-CM

## 2019-01-28 LAB
ANION GAP SERPL CALC-SCNC: 12 MMOL/L (ref 5–15)
ATRIAL RATE: 98 BPM
BASOPHILS # BLD: 0.1 K/UL (ref 0–0.1)
BASOPHILS NFR BLD: 1 % (ref 0–1)
BUN SERPL-MCNC: 13 MG/DL (ref 6–20)
BUN/CREAT SERPL: 16 (ref 12–20)
CALCIUM SERPL-MCNC: 9.9 MG/DL (ref 8.5–10.1)
CALCULATED P AXIS, ECG09: 48 DEGREES
CALCULATED R AXIS, ECG10: 16 DEGREES
CALCULATED T AXIS, ECG11: 41 DEGREES
CHLORIDE SERPL-SCNC: 105 MMOL/L (ref 97–108)
CO2 SERPL-SCNC: 26 MMOL/L (ref 21–32)
CREAT SERPL-MCNC: 0.8 MG/DL (ref 0.55–1.02)
D DIMER PPP FEU-MCNC: 0.45 MG/L FEU (ref 0–0.65)
DIAGNOSIS, 93000: NORMAL
DIFFERENTIAL METHOD BLD: NORMAL
EOSINOPHIL # BLD: 0.2 K/UL (ref 0–0.4)
EOSINOPHIL NFR BLD: 2 % (ref 0–7)
ERYTHROCYTE [DISTWIDTH] IN BLOOD BY AUTOMATED COUNT: 12.1 % (ref 11.5–14.5)
GLUCOSE SERPL-MCNC: 103 MG/DL (ref 65–100)
HCT VFR BLD AUTO: 41.3 % (ref 35–47)
HGB BLD-MCNC: 13.7 G/DL (ref 11.5–16)
IMM GRANULOCYTES # BLD AUTO: 0 K/UL (ref 0–0.04)
IMM GRANULOCYTES NFR BLD AUTO: 0 % (ref 0–0.5)
LYMPHOCYTES # BLD: 2.9 K/UL (ref 0.8–3.5)
LYMPHOCYTES NFR BLD: 30 % (ref 12–49)
MAGNESIUM SERPL-MCNC: 1.9 MG/DL (ref 1.6–2.4)
MCH RBC QN AUTO: 30.2 PG (ref 26–34)
MCHC RBC AUTO-ENTMCNC: 33.2 G/DL (ref 30–36.5)
MCV RBC AUTO: 91.2 FL (ref 80–99)
MONOCYTES # BLD: 0.7 K/UL (ref 0–1)
MONOCYTES NFR BLD: 8 % (ref 5–13)
NEUTS SEG # BLD: 5.7 K/UL (ref 1.8–8)
NEUTS SEG NFR BLD: 60 % (ref 32–75)
NRBC # BLD: 0 K/UL (ref 0–0.01)
NRBC BLD-RTO: 0 PER 100 WBC
P-R INTERVAL, ECG05: 124 MS
PLATELET # BLD AUTO: 315 K/UL (ref 150–400)
PMV BLD AUTO: 11 FL (ref 8.9–12.9)
POTASSIUM SERPL-SCNC: 3.8 MMOL/L (ref 3.5–5.1)
Q-T INTERVAL, ECG07: 356 MS
QRS DURATION, ECG06: 62 MS
QTC CALCULATION (BEZET), ECG08: 447 MS
RBC # BLD AUTO: 4.53 M/UL (ref 3.8–5.2)
SODIUM SERPL-SCNC: 143 MMOL/L (ref 136–145)
TROPONIN I SERPL-MCNC: <0.05 NG/ML
TSH SERPL DL<=0.05 MIU/L-ACNC: 1.44 UIU/ML (ref 0.36–3.74)
VENTRICULAR RATE, ECG03: 95 BPM
WBC # BLD AUTO: 9.5 K/UL (ref 3.6–11)

## 2019-01-28 PROCEDURE — 71046 X-RAY EXAM CHEST 2 VIEWS: CPT

## 2019-01-28 PROCEDURE — 80048 BASIC METABOLIC PNL TOTAL CA: CPT

## 2019-01-28 PROCEDURE — 84484 ASSAY OF TROPONIN QUANT: CPT

## 2019-01-28 PROCEDURE — 94762 N-INVAS EAR/PLS OXIMTRY CONT: CPT

## 2019-01-28 PROCEDURE — 84443 ASSAY THYROID STIM HORMONE: CPT

## 2019-01-28 PROCEDURE — 99284 EMERGENCY DEPT VISIT MOD MDM: CPT

## 2019-01-28 PROCEDURE — 36415 COLL VENOUS BLD VENIPUNCTURE: CPT

## 2019-01-28 PROCEDURE — 85025 COMPLETE CBC W/AUTO DIFF WBC: CPT

## 2019-01-28 PROCEDURE — 93005 ELECTROCARDIOGRAM TRACING: CPT

## 2019-01-28 PROCEDURE — 83735 ASSAY OF MAGNESIUM: CPT

## 2019-01-28 PROCEDURE — 85379 FIBRIN DEGRADATION QUANT: CPT

## 2019-01-28 RX ORDER — HYDROCODONE BITARTRATE AND ACETAMINOPHEN 5; 325 MG/1; MG/1
TABLET ORAL
Refills: 0 | COMMUNITY
Start: 2019-01-02 | End: 2022-03-25

## 2019-01-28 RX ORDER — BACLOFEN 10 MG/1
TABLET ORAL
Refills: 1 | COMMUNITY
Start: 2019-01-24 | End: 2019-10-28

## 2019-01-28 RX ORDER — GABAPENTIN 300 MG/1
CAPSULE ORAL 2 TIMES DAILY
COMMUNITY
Start: 2019-01-16 | End: 2022-07-29

## 2019-01-28 NOTE — PROGRESS NOTES
Chief Complaint   Patient presents with    Back Pain     went to see a back dr and mentioned that pulse rate was low---avg resting is around 40     1. Have you been to the ER, urgent care clinic since your last visit? Hospitalized since your last visit? No    2. Have you seen or consulted any other health care providers outside of the [address] [place] [address] since your last visit? Include any pap smears or colon screening. No     Elevated blood pressure.      avg 145/90     Last check 139/92

## 2019-01-28 NOTE — ED TRIAGE NOTES
Patient states that she saw a spine doctor a couple of weeks ago and they found that her pulse was low, referred her to pcp, pcp referred here b/c of irregular hear rate.

## 2019-01-28 NOTE — PROGRESS NOTES
Subjective:   Michelle Angeles is an 46 y.o. female who presents for  Evaluation of bradycardia. HPI  Chief Complaint   Patient presents with    Slow Heart Rate     went to see a back dr and mentioned that pulse rate was low---avg resting is around 40     She was seen by her pain specialist ( Dr. Tj Michelle) last week  and her heart rate was noted t be in low 40s. She was asymptomatic that time and was recommended to follow with PCP. She has not been checking her HR since then. She reports intermittent left sided chest discomfort associated with SOB and exertional dyspnea but denies any symptoms today. She states that symptoms usually occur when she feel anxious. She denies leg swelling, SOB. She has previously taking Prozac but stopped taking abruptly at the end of December. She has a hx of alcohol abuse when she used to take 1 bottle of wine 3-4 times a day. She reports that since her Gabapentin was increased by her pain specialist she \"does not have a desire to drink alcohol\". Her last drink was 2 weeks ago. She reports that her father had \"heart problem\" of which he  at the age of 47. She was not sure which disease he had but \"was recommended heart transplantation at some point\". Allergies - reviewed:   No Known Allergies      Medications - reviewed:  Current Outpatient Medications   Medication Sig    gabapentin (NEURONTIN) 300 mg capsule gabapentin 300 mg capsule   Take 1 capsule 4 times a day by oral route after meals for 90 days.  baclofen (LIORESAL) 10 mg tablet TAKE 1 TABLET BY MOUTH TWICE A DAY AS NEEDED    HYDROcodone-acetaminophen (NORCO) 5-325 mg per tablet TAKE 1/2 TABLET TWICE A DAY AS NEEDED FOR PAIN    ethinyl estradiol-etonogestrel (NUVARING) 0.12-0.015 mg/24 hr vaginal ring Insert vaginally for 3 weeks;remove for 1 week.  FLUoxetine (PROZAC) 20 mg capsule Take 1 Cap by mouth daily. No current facility-administered medications for this visit.           Past Medical History - reviewed:  Past Medical History:   Diagnosis Date    Abnormal mammogram 2016    MMG birads 3/cysts. - 6 month follow up needed    Abnormal mammogram of left breast 2018; 18    Left breast retroareolar focal asymmetry; normal, rec 6 month f/u    Abnormal Pap smear     Depressive disorder     History of bladder infections     History of mammogram 07/28/15    Small neodensity right breast for which diagnostic mammogram recommended. Hammad    PROM @ 34 wks-2#;? abruption;breech    Pap smear for cervical cancer screening 2010; 7/28/15; 18    normal; Negative; neg, hpv neg         Past Surgical History - reviewed:  Past Surgical History:   Procedure Laterality Date    HX  SECTION  1985    x3-1st low vert/ext to myometrium; 5851;0730    HX OTHER SURGICAL      to cx         Family History - reviewed:  Family History   Problem Relation Age of Onset    No Known Problems Mother     No Known Problems Father          Social History - reviewed:  Social History     Socioeconomic History    Marital status:      Spouse name: Not on file    Number of children: Not on file    Years of education: Not on file    Highest education level: Not on file   Social Needs    Financial resource strain: Not on file    Food insecurity - worry: Not on file    Food insecurity - inability: Not on file   [employer] needs - medical: Not on file   [employer] needs - non-medical: Not on file   Occupational History    Not on file   Tobacco Use    Smoking status: Never Smoker    Smokeless tobacco: Never Used   Substance and Sexual Activity    Alcohol use: No    Drug use: No    Sexual activity: Yes     Partners: Male     Birth control/protection: Inserts   Other Topics Concern    Not on file   Social History Narrative    Not on file         Review of Systems   CONSTITUTIONAL: denies fever. Denies chills. EYES: denies double vision.   CARDIOVASCULAR: denies chest pain. Denies palpitations  RESPIRATORY: denies shortness of breath  NEURO:Denies dizziness, denies headache        Objective:     Visit Vitals  /81 Comment: standing   Pulse (!) 49   Temp 98.7 °F (37.1 °C) (Oral)   Resp 16   Ht 5' 3\" (1.6 m)   Wt 160 lb (72.6 kg)   SpO2 96%   BMI 28.34 kg/m²       General appearance - alert, well appearing, and in no distress  Neck - supple, no significant adenopathy  Chest - clear to auscultation, no wheezes, rales or rhonchi, symmetric air entry  Heart - normal rate, regular rhythm, normal S1, S2, no murmurs, rubs, clicks or gallops  Extremities - peripheral pulses normal, no pedal edema, no clubbing or cyanosis  Skin - normal coloration and turgor, no rashes, no suspicious skin lesions noted      Assessment:   45 yo female who is here for:     ICD-10-CM ICD-9-CM    1. Bradycardia R00.1 427.89 AMB POC EKG ROUTINE W/ 12 LEADS, INTER & REP       Plan:   · HR was personally rechecked multiple times,the rate was irregular with maintenance HR in low 40s with episodes of rapid HR. EKG with HR of 89, frequent PVCs, wide QRS complex. The patient is asymptomatic  · Discussed the findings with the patient. Recommended to go to the hospital for observation and evaluation of bradycardia. She agreed with the plan. Discussed the clinical findings with [facility] team and the resident on ER ( Dr. Zaheer Womack). · The patient's friend is coming to pick the patient and drive her to the hospital.     Follow-up Disposition:  Return for The patient was sent to ER. I have discussed the diagnosis with the patient and the intended plan as seen in the above orders. The patient has received an after-visit summary and questions were answered concerning future plans. I have discussed medication side effects and warnings with the patient as well. Informed pt to return to the office if new symptoms arise.     The patient was seen and discussed with Dr. Indigo Macias( the attending physician)      Cherri Matta MD  Family Medicine Resident, PGY-3

## 2019-01-28 NOTE — PROGRESS NOTES
46year old female here for bradycardia    Followed by ortho for cervical stenosis    On gabapentin     Also on baclofen and norco per pain management physician    Heart rate between 40's to 80's    Will send to hospital for probable admission    I reviewed with the resident the medical history and the resident's findings on the physical examination. I discussed with the resident the patient's diagnosis and concur with the plan.

## 2019-01-28 NOTE — ED NOTES
Verbal shift change report given to Luly Grover RN (oncoming nurse) by Parul Mccallum RN (offgoing nurse). Report included the following information SBAR, ED Summary, Intake/Output, MAR, Recent Results and Cardiac Rhythm SR with PVCs. Sheryl Keith

## 2019-01-28 NOTE — DISCHARGE INSTRUCTIONS
Patient Education            We hope that we have addressed all of your medical concerns. The examination and treatment you received in the Emergency Department were for an emergent problem and were not intended as complete care. It is important that you follow up with your healthcare provider(s) for ongoing care. If your symptoms worsen or do not improve as expected, and you are unable to reach your usual health care provider(s), you should return to the Emergency Department. Today's healthcare is undergoing tremendous change, and patient satisfaction surveys are one of the many tools to assess the quality of medical care. You may receive a survey from the [employer] regarding your experience in the Emergency Department. I hope that your experience has been completely positive, particularly the medical care that I provided. As such, please participate in the survey; anything less than excellent does not meet my expectations or intentions. Formerly [facility] and [facility] participate in nationally recognized quality of care measures. If your blood pressure is greater than 120/80, as reported below, we urge that you seek medical care to address the potential of high blood pressure, commonly known as hypertension. Hypertension can be hereditary or can be caused by certain medical conditions, pain, stress, or \"white coat syndrome. \"       Please make an appointment with your health care provider(s) for follow up of your Emergency Department visit. VITALS:   Patient Vitals for the past 8 hrs:   Temp Pulse Resp BP SpO2   01/28/19 1429 98 °F (36.7 °C) 83 16 132/69 97 %          Thank you for allowing us to provide you with medical care today. We realize that you have many choices for your emergency care needs. Please choose us in the future for any continued health care needs.       Michelle Kelly MD    [address] [facility] [address] [facility].   Office: [phone]            Recent Results (from the past 24 hour(s))   CBC WITH AUTOMATED DIFF    Collection Time: 01/28/19  3:08 PM   Result Value Ref Range    WBC 9.5 3.6 - 11.0 K/uL    RBC 4.53 3.80 - 5.20 M/uL    HGB 13.7 11.5 - 16.0 g/dL    HCT 41.3 35.0 - 47.0 %    MCV 91.2 80.0 - 99.0 FL    MCH 30.2 26.0 - 34.0 PG    MCHC 33.2 30.0 - 36.5 g/dL    RDW 12.1 11.5 - 14.5 %    PLATELET 301 945 - 580 K/uL    MPV 11.0 8.9 - 12.9 FL    NRBC 0.0 0  WBC    ABSOLUTE NRBC 0.00 0.00 - 0.01 K/uL    NEUTROPHILS 60 32 - 75 %    LYMPHOCYTES 30 12 - 49 %    MONOCYTES 8 5 - 13 %    EOSINOPHILS 2 0 - 7 %    BASOPHILS 1 0 - 1 %    IMMATURE GRANULOCYTES 0 0.0 - 0.5 %    ABS. NEUTROPHILS 5.7 1.8 - 8.0 K/UL    ABS. LYMPHOCYTES 2.9 0.8 - 3.5 K/UL    ABS. MONOCYTES 0.7 0.0 - 1.0 K/UL    ABS. EOSINOPHILS 0.2 0.0 - 0.4 K/UL    ABS. BASOPHILS 0.1 0.0 - 0.1 K/UL    ABS. IMM. GRANS. 0.0 0.00 - 0.04 K/UL    DF AUTOMATED     METABOLIC PANEL, BASIC    Collection Time: 01/28/19  3:08 PM   Result Value Ref Range    Sodium 143 136 - 145 mmol/L    Potassium 3.8 3.5 - 5.1 mmol/L    Chloride 105 97 - 108 mmol/L    CO2 26 21 - 32 mmol/L    Anion gap 12 5 - 15 mmol/L    Glucose 103 (H) 65 - 100 mg/dL    BUN 13 6 - 20 MG/DL    Creatinine 0.80 0.55 - 1.02 MG/DL    BUN/Creatinine ratio 16 12 - 20      GFR est AA >60 >60 ml/min/1.73m2    GFR est non-AA >60 >60 ml/min/1.73m2    Calcium 9.9 8.5 - 10.1 MG/DL   MAGNESIUM    Collection Time: 01/28/19  3:08 PM   Result Value Ref Range    Magnesium 1.9 1.6 - 2.4 mg/dL   TSH 3RD GENERATION    Collection Time: 01/28/19  3:08 PM   Result Value Ref Range    TSH 1.44 0.36 - 3.74 uIU/mL   TROPONIN I    Collection Time: 01/28/19  3:08 PM   Result Value Ref Range    Troponin-I, Qt. <0.05 <0.05 ng/mL   D DIMER    Collection Time: 01/28/19  4:19 PM   Result Value Ref Range    D-dimer 0.45 0.00 - 0.65 mg/L FEU       Xr Chest Pa Lat    Result Date: 1/28/2019  Indication: Shortness of breath. Irregular heartbeat. Exam: PA and lateral views of the chest. There is no prior study for direct comparison. Findings: Cardiomediastinal silhouette is within normal limits. Lungs are clear bilaterally. Pleural spaces are normal. Osseous structures are intact. IMPRESSION: No acute cardiopulmonary disease. Chest Pain: Care Instructions  Your Care Instructions    There are many things that can cause chest pain. Some are not serious and will get better on their own in a few days. But some kinds of chest pain need more testing and treatment. Your doctor may have recommended a follow-up visit in the next 8 to 12 hours. If you are not getting better, you may need more tests or treatment. Even though your doctor has released you, you still need to watch for any problems. The doctor carefully checked you, but sometimes problems can develop later. If you have new symptoms or if your symptoms do not get better, get medical care right away. If you have worse or different chest pain or pressure that lasts more than 5 minutes or you passed out (lost consciousness), call 911 or seek other emergency help right away. A medical visit is only one step in your treatment. Even if you feel better, you still need to do what your doctor recommends, such as going to all suggested follow-up appointments and taking medicines exactly as directed. This will help you recover and help prevent future problems. How can you care for yourself at home? · Rest until you feel better. · Take your medicine exactly as prescribed. Call your doctor if you think you are having a problem with your medicine. · Do not drive after taking a prescription pain medicine. When should you call for help? Call 911 if:    · You passed out (lost consciousness).     · You have severe difficulty breathing.     · You have symptoms of a heart attack. These may include:  ?  Chest pain or pressure, or a strange feeling in your chest.  ? Sweating. ? Shortness of breath. ? Nausea or vomiting. ? Pain, pressure, or a strange feeling in your back, neck, jaw, or upper belly or in one or both shoulders or arms. ? Lightheadedness or sudden weakness. ? A fast or irregular heartbeat. After you call 911, the  may tell you to chew 1 adult-strength or 2 to 4 low-dose aspirin. Wait for an ambulance. Do not try to drive yourself.    Call your doctor today if:    · You have any trouble breathing.     · Your chest pain gets worse.     · You are dizzy or lightheaded, or you feel like you may faint.     · You are not getting better as expected.     · You are having new or different chest pain. Where can you learn more? Go to http://zohaib-chad.info/. Enter A120 in the search box to learn more about \"Chest Pain: Care Instructions. \"  Current as of: September 23, 2018  Content Version: 11.9  © 7855-9129 [employer]. Care instructions adapted under license by [employer] (which disclaims liability or warranty for this information). If you have questions about a medical condition or this instruction, always ask your healthcare professional. [place] [address] any warranty or liability for your use of this information. Patient Education        Premature Heartbeat: Care Instructions  Your Care Instructions    A premature heartbeat happens when the heart beats earlier than it should. This briefly interrupts the heart's rhythm. You do not usually feel the early heartbeat, and the next beat is stronger. To many people, this feels like a skipped heartbeat or a flutter. This heartbeat is also called a premature ventricular contraction (PVC). If you have no heart problems, premature heartbeats that happen once in a while are not a cause for concern. Most people have them at some time. They may happen more often if you drink a lot of caffeine or alcohol or are under stress.   Usually, no cause for a premature heartbeat is found, and no treatment is needed. Some people may take medicine to prevent these heartbeats and to relieve symptoms. Follow-up care is a key part of your treatment and safety. Be sure to make and go to all appointments, and call your doctor if you are having problems. It's also a good idea to know your test results and keep a list of the medicines you take. How can you care for yourself at home? · Limit caffeine and other stimulants if they trigger premature heartbeats. · Reduce stress. Avoid people and places that make you feel anxious, if you can. Learn ways to reduce stress, such as biofeedback, guided imagery, and meditation. · Do not smoke or allow others to smoke around you. If you need help quitting, talk to your doctor about stop-smoking programs and medicines. These can increase your chances of quitting for good. · Get at least 30 minutes of exercise on most days of the week. Walking is a good choice. You also may want to do other activities, such as running, swimming, cycling, or playing tennis or team sports. · Eat heart-healthy foods. · Stay at a healthy weight. Lose weight if you need to. · Get enough sleep. Keep your room dark and quiet, and try to go to bed at the same time every night. · Limit alcohol to 2 drinks a day for men and 1 drink a day for women. Too much alcohol can cause health problems. If drinking alcohol causes more premature heartbeats, do not drink it. · If your doctor prescribes medicine, take it exactly as prescribed. Call your doctor if you think you are having a problem with your medicine. When should you call for help? Call 911 anytime you think you may need emergency care.  For example, call if:    · You passed out (lost consciousness).    Call your doctor now or seek immediate medical care if:    · You are dizzy or lightheaded, or you feel like you may faint.     · You are short of breath.    Watch closely for changes in your health, and be sure to contact your doctor if you have any problems. Where can you learn more? Go to http://zohaib-chad.info/. Enter B189 in the search box to learn more about \"Premature Heartbeat: Care Instructions. \"  Current as of: July 22, 2018  Content Version: 11.9  © 7222-8973 [employer]. Care instructions adapted under license by [employer] (which disclaims liability or warranty for this information). If you have questions about a medical condition or this instruction, always ask your healthcare professional. [address] any warranty or liability for your use of this information.

## 2019-01-28 NOTE — ED PROVIDER NOTES
46 y.o. female with past medical history significant for Depression and Spinal Stenosis who presents from home with chief complaint of irregular heart beat. Patient states she was seen by her spine doctor a couple of weeks ago and found to have low pulse, was then seen by her PCP prior to arrival for low pulse, found to have HR in the \"40-85s\", performed EKG which showed \"irregular heart beat\", and was referred to [facility] ED for further evaluation. Patient presents to [facility] ED in sinus rhythm with PVCs, and upon examination has HR 83 bpm. Patient reports accompanying mild SOB, intermittent chest discomfort described as \"pressure\" for a while, and lower back pain which is seen at baseline and currently rated as 4/10 in severity. Patient reports taking Gabapentin daily, as well as Baclofen and Hydrocodone as needed for back pain. Patient notes being under moderate stress recently due to having \"crazy kids. \" Patient denies recent travel or hormone therapy. Pt denies fever, chills, cough, congestion, chest pain, abdominal pain, nausea, vomiting, diarrhea, difficulty with urination or dysuria. There are no other acute medical concerns at this time. Note written by Timmy Sorenson, as dictated by Beatrice Lundberg MD 2:30 PM 
 
 
 
The history is provided by the patient. Past Medical History:  
Diagnosis Date  Abnormal mammogram 08/05/2016 [facility] birads 3/cysts. - 6 month follow up needed  Abnormal mammogram of left breast 02/08/2018; 2/20/18 Left breast retroareolar focal asymmetry; normal, rec 6 month f/u  Abnormal Pap smear  Depressive disorder  History of bladder infections  History of mammogram 07/28/15 Small neodensity right breast for which diagnostic mammogram recommended. Hammad PROM @ 29 wks-2#;? abruption;breech  Pap smear for cervical cancer screening 7/17/2010; 7/28/15; 6/11/18  
 normal; Negative; neg, hpv neg Past Surgical History: Procedure Laterality Date [address] [facility] [address] x3-1st low vert/ext to myometrium; 4431;0049  HX OTHER SURGICAL    
 to cx Family History:  
Problem Relation Age of Onset  No Known Problems Mother  No Known Problems Father Social History Socioeconomic History  Marital status:  Spouse name: Not on file  Number of children: Not on file  Years of education: Not on file  Highest education level: Not on file Social Needs  Financial resource strain: Not on file  Food insecurity - worry: Not on file  Food insecurity - inability: Not on file  Transportation needs - medical: Not on file  Transportation needs - non-medical: Not on file Occupational History  Not on file Tobacco Use  Smoking status: Never Smoker  Smokeless tobacco: Never Used Substance and Sexual Activity  Alcohol use: No  
 Drug use: No  
 Sexual activity: Yes  
  Partners: Male Birth control/protection: Inserts Other Topics Concern  Not on file Social History Narrative  Not on file ALLERGIES: Patient has no known allergies. Review of Systems Constitutional: Negative for chills and fever. HENT: Negative for congestion. Respiratory: Positive for shortness of breath. Negative for cough. Cardiovascular: Negative for chest pain. Gastrointestinal: Negative for abdominal pain, diarrhea, nausea and vomiting. Genitourinary: Negative for difficulty urinating and dysuria. Musculoskeletal: Positive for back pain. All other systems reviewed and are negative. There were no vitals filed for this visit. Physical Exam  
Constitutional: She is oriented to person, place, and time. She appears well-developed and well-nourished. HENT:  
Head: Normocephalic and atraumatic. Eyes: Conjunctivae are normal.  
Neck: Normal range of motion.   
Cardiovascular: Normal rate, regular rhythm, normal heart sounds and intact distal pulses. No murmur heard. Pulmonary/Chest: Effort normal and breath sounds normal. No stridor. No respiratory distress. She has no wheezes. She has no rales. Abdominal: Soft. Bowel sounds are normal. She exhibits no distension and no mass. There is no tenderness. There is no guarding. Musculoskeletal: Normal range of motion. Neurological: She is alert and oriented to person, place, and time. No cranial nerve deficit. Coordination normal.  
Skin: Skin is warm and dry. Psychiatric:  
anxious Nursing note and vitals reviewed. MDM Number of Diagnoses or Management Options Atypical chest pain:  
PAC (premature atrial contraction): PVC (premature ventricular contraction):  
Diagnosis management comments: ekg with pvc's will check electrolytes, tsh, hb, troponin, though cp and sob does not seem like its acute and pt is under stress. She does not smoke or have any risk factors for cad. She did used to drink etoh. hasnt drank for over a month. Could cause holiday heart Amount and/or Complexity of Data Reviewed Clinical lab tests: ordered and reviewed Tests in the radiology section of CPT®: ordered and reviewed Independent visualization of images, tracings, or specimens: yes (ekg) Patient Progress Patient progress: stable Procedures ED EKG interpretation: 
Rhythm: normal sinus rhythm with PVCs; and regular . Rate (approx.): 95 bpm; Axis: normal; ST/T wave: Nonspecific ST waves. Note written by Timmy Rogers, as dictated by Joce Honeycutt MD 2:31 PM 
 
PROGRESS NOTE: 
4:10 PM Provider reviewed patient's telemetry which showed PACs and occasional PVCs, will refer to cardiology. 4:13 PM Provider asked patient if she was still on Nuvaring, patient endorses that she is still on nuvaring despite initially telling provider she was not on hormone therapy. Will check ddimer 5:17 PM 
Patient's results have been reviewed with them.   Patient has verbally conveyed their understanding and agreement of the patient's signs, symptoms, diagnosis, treatment and prognosis and additionally agree to follow up as recommended or return to the Emergency Room should their condition change prior to follow-up. Discharge instructions have also been provided to the patient with some educational information regarding their diagnosis as well a list of reasons why they would want to return to the ER prior to their follow-up appointment should their condition change.

## 2019-02-22 ENCOUNTER — TELEPHONE (OUTPATIENT)
Dept: FAMILY MEDICINE CLINIC | Age: 49
End: 2019-02-22

## 2019-02-22 NOTE — TELEPHONE ENCOUNTER
[phone]  Patient said she was here over three weeks ago, and the doctor was to have ordered for her an antidepressant. She said this has not been done.

## 2019-02-22 NOTE — TELEPHONE ENCOUNTER
I called patient and she indicated that she talked to you at last visit regarding a refill of her anti-depressant. Would you be able to refill for her.

## 2019-02-22 NOTE — TELEPHONE ENCOUNTER
I called and left a message for patient to call back regarding medication.  Let her know that she will need an appt for evaluation med and for refill

## 2019-02-28 NOTE — TELEPHONE ENCOUNTER
Per call from [facility], Patient calling for status of med refill for anti-depressant. Per notes appt is required. Relayed this information.

## 2019-03-01 NOTE — TELEPHONE ENCOUNTER
I called and left a message for patient letting her know that she will need an appt for refill of her anti-depressant.

## 2019-03-14 ENCOUNTER — OFFICE VISIT (OUTPATIENT)
Dept: FAMILY MEDICINE CLINIC | Age: 49
End: 2019-03-14

## 2019-03-14 VITALS
WEIGHT: 160.2 LBS | OXYGEN SATURATION: 96 % | HEIGHT: 63 IN | SYSTOLIC BLOOD PRESSURE: 114 MMHG | BODY MASS INDEX: 28.39 KG/M2 | DIASTOLIC BLOOD PRESSURE: 70 MMHG | HEART RATE: 64 BPM | TEMPERATURE: 98.3 F | RESPIRATION RATE: 18 BRPM

## 2019-03-14 DIAGNOSIS — F33.1 MODERATE EPISODE OF RECURRENT MAJOR DEPRESSIVE DISORDER (HCC): ICD-10-CM

## 2019-03-14 DIAGNOSIS — F41.9 ANXIETY: ICD-10-CM

## 2019-03-14 DIAGNOSIS — F51.01 PRIMARY INSOMNIA: Primary | ICD-10-CM

## 2019-03-14 RX ORDER — VENLAFAXINE HYDROCHLORIDE 75 MG/1
75 CAPSULE, EXTENDED RELEASE ORAL DAILY
Qty: 30 CAP | Refills: 0 | Status: SHIPPED | OUTPATIENT
Start: 2019-03-14 | End: 2019-03-28 | Stop reason: ALTCHOICE

## 2019-03-14 NOTE — PATIENT INSTRUCTIONS

## 2019-03-14 NOTE — PROGRESS NOTES
Alyssa Lutz is a 46 y.o. female  Chief Complaint   Patient presents with    Depression     denies taking any medication to treat symptoms, states has been feeling depressed since age 13, denies seeing any mental health providers but is open to it     Visit Vitals  /70 (BP 1 Location: Left arm, BP Patient Position: Sitting)   Pulse 64   Temp 98.3 °F (36.8 °C) (Oral)   Resp 18   Ht 5' 3\" (1.6 m)   Wt 160 lb 3.2 oz (72.7 kg)   SpO2 96%   BMI 28.38 kg/m²     1. Have you been to the ER, urgent care clinic since your last visit? Hospitalized since your last visit? No    2. Have you seen or consulted any other health care providers outside of the [facility] since your last visit? Include any pap smears or colon screening.  No  Health Maintenance Due   Topic Date Due    Shingrix Vaccine Age 50> (1 of 2) 07/09/2018    FOBT Q 1 YEAR AGE 50-75  07/09/2018    Influenza Age 5 to Adult  08/01/2018    BREAST CANCER SCRN MAMMOGRAM  02/20/2019

## 2019-03-14 NOTE — PROGRESS NOTES
No chief complaint on file. 1. Have you been to the ER, urgent care clinic since your last visit? Hospitalized since your last visit? {Yes when where and reason for visit:20441}    2. Have you seen or consulted any other health care providers outside of the [address] [place] [address] since your last visit? Include any pap smears or colon screening. {Yes when where and reason for visit:20441}  There were no vitals taken for this visit.   Health Maintenance Due   Topic Date Due    Shingrix Vaccine Age 50> (1 of 2) 07/09/2018    FOBT Q 1 YEAR AGE 50-75  07/09/2018    Influenza Age 5 to Adult  08/01/2018    BREAST CANCER SCRN MAMMOGRAM  02/20/2019

## 2019-03-17 PROBLEM — F41.9 ANXIETY: Status: ACTIVE | Noted: 2019-03-17

## 2019-03-17 PROBLEM — F33.1 MODERATE EPISODE OF RECURRENT MAJOR DEPRESSIVE DISORDER (HCC): Status: ACTIVE | Noted: 2019-03-17

## 2019-03-28 ENCOUNTER — OFFICE VISIT (OUTPATIENT)
Dept: FAMILY MEDICINE CLINIC | Age: 49
End: 2019-03-28

## 2019-03-28 VITALS
HEIGHT: 63 IN | SYSTOLIC BLOOD PRESSURE: 89 MMHG | DIASTOLIC BLOOD PRESSURE: 62 MMHG | HEART RATE: 61 BPM | WEIGHT: 156 LBS | OXYGEN SATURATION: 95 % | TEMPERATURE: 98 F | RESPIRATION RATE: 16 BRPM | BODY MASS INDEX: 27.64 KG/M2

## 2019-03-28 DIAGNOSIS — Z23 ENCOUNTER FOR IMMUNIZATION: ICD-10-CM

## 2019-03-28 DIAGNOSIS — F41.9 ANXIETY: ICD-10-CM

## 2019-03-28 DIAGNOSIS — F33.1 MODERATE EPISODE OF RECURRENT MAJOR DEPRESSIVE DISORDER (HCC): Primary | ICD-10-CM

## 2019-03-28 DIAGNOSIS — F51.01 PRIMARY INSOMNIA: ICD-10-CM

## 2019-03-28 DIAGNOSIS — I49.9 CARDIAC ARRHYTHMIA, UNSPECIFIED CARDIAC ARRHYTHMIA TYPE: ICD-10-CM

## 2019-03-28 RX ORDER — VENLAFAXINE HYDROCHLORIDE 150 MG/1
150 CAPSULE, EXTENDED RELEASE ORAL DAILY
Qty: 30 CAP | Refills: 0 | Status: SHIPPED | OUTPATIENT
Start: 2019-03-28 | End: 2019-04-24 | Stop reason: SDUPTHER

## 2019-03-28 RX ORDER — METOPROLOL TARTRATE 25 MG/1
TABLET, FILM COATED ORAL 2 TIMES DAILY
COMMUNITY
End: 2019-04-24 | Stop reason: SDUPTHER

## 2019-03-28 NOTE — PROGRESS NOTES
HPI     CC: depression    Lisbeth Dickson is a 46 y.o. female with depression who presents for follow up     At last appointment about 2 weeks ago, pt was started on Effexor and referred to psychiatry for treatment-resistant depression and anxiety. Symptoms mildly improved. Nausea resolved. Tolerating medication well. Follows with Dr. Jayme Santos for an irregular heart beat and was started on metoprolol about 1 month ago; currently wearing holter monitor. Denies headache, dizziness, blurry vision, chest pain, SOB, nausea, vomiting. PMHx - Reviewed  Past Medical History:   Diagnosis Date    Abnormal mammogram 08/05/2016    MMG birads 3/cysts. - 6 month follow up needed    Abnormal mammogram of left breast 02/08/2018; 2/20/18    Left breast retroareolar focal asymmetry; normal, rec 6 month f/u    Abnormal Pap smear     Depressive disorder     History of bladder infections     History of mammogram 07/28/15    Small neodensity right breast for which diagnostic mammogram recommended. [facility]ammad    PROM @ 29 wks-2#;? abruption;breech    Pap smear for cervical cancer screening 7/17/2010; 7/28/15; 6/11/18    normal; Negative; neg, hpv neg       Meds - Reviewed  Current Outpatient Medications   Medication Sig Dispense Refill    metoprolol tartrate (LOPRESSOR) 25 mg tablet Take  by mouth two (2) times a day.  venlafaxine-SR (EFFEXOR XR) 75 mg capsule Take 1 Cap by mouth daily. 30 Cap 0    gabapentin (NEURONTIN) 300 mg capsule gabapentin 300 mg capsule   Take 1 capsule 4 times a day by oral route after meals for 90 days.  baclofen (LIORESAL) 10 mg tablet TAKE 1 TABLET BY MOUTH TWICE A DAY AS NEEDED  1    HYDROcodone-acetaminophen (NORCO) 5-325 mg per tablet TAKE 1/2 TABLET TWICE A DAY AS NEEDED FOR PAIN  0    ethinyl estradiol-etonogestrel (NUVARING) 0.12-0.015 mg/24 hr vaginal ring Insert vaginally for 3 weeks;remove for 1 week.  3 Device 4       Allergies - Reviewed  No Known Allergies    Smoker - Reviewed  Social History     Tobacco Use   Smoking Status Never Smoker   Smokeless Tobacco Never Used       ETOH - Reviewed  Social History     Substance and Sexual Activity   Alcohol Use No       FH - Reviewed  Family History   Problem Relation Age of Onset    No Known Problems Mother     No Known Problems Father        ROS:  Review of Systems   Constitutional: Negative for fatigue and fever. Eyes: Negative for visual disturbance. Respiratory: Negative for chest tightness and shortness of breath. Cardiovascular: Negative for chest pain and palpitations. Gastrointestinal: Negative for abdominal pain, nausea and vomiting. Neurological: Negative for dizziness, light-headedness and headaches. Psychiatric/Behavioral: Positive for decreased concentration, dysphoric mood and sleep disturbance. Negative for self-injury and suicidal ideas. The patient is nervous/anxious. Physical Exam:  Visit Vitals  BP (!) 89/62   Pulse 61   Temp 98 °F (36.7 °C) (Oral)   Resp 16   Ht 5' 3\" (1.6 m)   Wt 156 lb (70.8 kg)   SpO2 95%   BMI 27.63 kg/m²       Wt Readings from Last 3 Encounters:   03/28/19 156 lb (70.8 kg)   03/14/19 160 lb 3.2 oz (72.7 kg)   01/28/19 156 lb (70.8 kg)     BP Readings from Last 3 Encounters:   03/28/19 (!) 89/62   03/14/19 114/70   01/28/19 119/87        Physical Exam   Constitutional: She is oriented to person, place, and time. She appears well-developed and well-nourished. No distress. HENT:   Head: Normocephalic and atraumatic. Mouth/Throat: Oropharynx is clear and moist.   Eyes: Conjunctivae are normal.   Cardiovascular: Normal rate and regular rhythm. Pulmonary/Chest: Effort normal and breath sounds normal. No respiratory distress. She has no wheezes. Neurological: She is alert and oriented to person, place, and time. She exhibits normal muscle tone. Coordination normal.   Skin: Skin is warm and dry. She is not diaphoretic.    Psychiatric: She has a normal mood and affect. Her behavior is normal.   Nursing note and vitals reviewed. Assessment     46 y.o. female with anxiety, depression presents for follow up:  Patient Active Problem List   Diagnosis Code    Mod[facility] F33.1    Anxiety F41.9       Today's diagnoses are:    ICD-10-CM ICD-9-CM    1. Moderate episode of recurrent major depressive disorder (HCC) F33.1 296.32 venlafaxine-SR (EFFEXOR XR) 150 mg capsule   2. Anxiety F41.9 300.00    3. Primary insomnia F51.01 307.42    4. Encounter for immunization Z23 V03.89 INFLUENZA VIRUS VAC QUAD,SPLIT,PRESV FREE SYRINGE IM      AL IMMUNIZ ADMIN,1 SINGLE/COMB VAC/TOXOID   5. Cardiac arrhythmia, unspecified cardiac arrhythmia type I49.9 427.9               Plan     1. Anxiety, Depression   - increase to Effexor 150 mg daily  - RTC in 2-3 weeks for follow up     2. Encounter for immunization  - flu vaccine today     3. Arrhythmia, Hypotension - asymptomatic. Currently wearing cardiac monitor   - continue Metoprolol. Recommended patient discuss with cardiology regarding dosage, due to her BP today although she was asymptomatic   - follow with cardiology  - release of information paperwork filled out today     Follow up in 2-3 weeks     Prior labs and imaging were reviewed. I have discussed the diagnosis with the patient and the intended plan as seen in the above orders. The patient has received an after-visit summary and questions were answered concerning future plans. I have discussed medication side effects and warnings with the patient as well. Patient discussed with Dr. Keely Lau, Attending Physician.     Yelena Melendez MD, PGY3  Family Medicine Resident

## 2019-03-28 NOTE — PROGRESS NOTES
Chief Complaint   Patient presents with    Depression     1. Have you been to the ER, urgent care clinic since your last visit? Hospitalized since your last visit? No    2. Have you seen or consulted any other health care providers outside of the [address] [place] [address] since your last visit? Include any pap smears or colon screening.  No

## 2019-04-11 ENCOUNTER — OFFICE VISIT (OUTPATIENT)
Dept: FAMILY MEDICINE CLINIC | Age: 49
End: 2019-04-11

## 2019-04-11 VITALS
WEIGHT: 150 LBS | OXYGEN SATURATION: 99 % | HEART RATE: 49 BPM | BODY MASS INDEX: 26.58 KG/M2 | DIASTOLIC BLOOD PRESSURE: 59 MMHG | RESPIRATION RATE: 16 BRPM | SYSTOLIC BLOOD PRESSURE: 97 MMHG | HEIGHT: 63 IN | TEMPERATURE: 97.9 F

## 2019-04-11 DIAGNOSIS — F41.9 ANXIETY: ICD-10-CM

## 2019-04-11 DIAGNOSIS — F33.1 MODERATE EPISODE OF RECURRENT MAJOR DEPRESSIVE DISORDER (HCC): ICD-10-CM

## 2019-04-11 DIAGNOSIS — F51.01 PRIMARY INSOMNIA: Primary | ICD-10-CM

## 2019-04-11 RX ORDER — BUPROPION HYDROCHLORIDE 150 MG/1
150 TABLET ORAL
Qty: 30 TAB | Refills: 0 | Status: SHIPPED | OUTPATIENT
Start: 2019-04-11 | End: 2019-04-24 | Stop reason: SDUPTHER

## 2019-04-11 NOTE — PATIENT INSTRUCTIONS

## 2019-04-11 NOTE — PROGRESS NOTES
HPI     CC: depression follow up     Nick Rivero is a 46 y.o. female with hx of depression who presents for follow up. At last appointment, Effexor was increased to 150mg daily. States that she has seen a mild improvement in symptoms, but not much. Has appointment scheduled with psychiatry next week. Sleeps about 6 hours/ night. Working on diet and exercise to lose weight, but is eating appropriately. Denies hx of seizures or eating disorder. PMHx - Reviewed  Past Medical History:   Diagnosis Date    Abnormal mammogram 08/05/2016    MMG birads 3/cysts. - 6 month follow up needed    Abnormal mammogram of left breast 02/08/2018; 2/20/18    Left breast retroareolar focal asymmetry; normal, rec 6 month f/u    Abnormal Pap smear     Depressive disorder     History of bladder infections     History of mammogram 07/28/15    Small neodensity right breast for which diagnostic mammogram recommended. [facility]    PROM @ 29 wks-2#;? abruption;breech    Pap smear for cervical cancer screening 7/17/2010; 7/28/15; 6/11/18    normal; Negative; neg, hpv neg       Meds - Reviewed  Current Outpatient Medications   Medication Sig Dispense Refill    metoprolol tartrate (LOPRESSOR) 25 mg tablet Take  by mouth two (2) times a day.  venlafaxine-SR (EFFEXOR XR) 150 mg capsule Take 1 Cap by mouth daily. 30 Cap 0    gabapentin (NEURONTIN) 300 mg capsule gabapentin 300 mg capsule   Take 1 capsule 4 times a day by oral route after meals for 90 days.  baclofen (LIORESAL) 10 mg tablet TAKE 1 TABLET BY MOUTH TWICE A DAY AS NEEDED  1    HYDROcodone-acetaminophen (NORCO) 5-325 mg per tablet TAKE 1/2 TABLET TWICE A DAY AS NEEDED FOR PAIN  0    ethinyl estradiol-etonogestrel (NUVARING) 0.12-0.015 mg/24 hr vaginal ring Insert vaginally for 3 weeks;remove for 1 week.  3 Device 4       Allergies - Reviewed  No Known Allergies    Smoker - Reviewed  Social History     Tobacco Use   Smoking Status Never Smoker   Smokeless Tobacco Never Used       ETOH - Reviewed  Social History     Substance and Sexual Activity   Alcohol Use No       FH - Reviewed  Family History   Problem Relation Age of Onset    No Known Problems Mother     No Known Problems Father        ROS:  Review of Systems   Constitutional: Negative. Negative for activity change, appetite change, chills, diaphoresis, fatigue, fever and unexpected weight change. Respiratory: Negative for chest tightness and shortness of breath. Cardiovascular: Negative for chest pain and palpitations. Gastrointestinal: Negative for abdominal pain, nausea and vomiting. Neurological: Negative for dizziness, light-headedness and headaches. Psychiatric/Behavioral: Positive for dysphoric mood. Negative for agitation, behavioral problems, confusion, decreased concentration, hallucinations, self-injury, sleep disturbance and suicidal ideas. The patient is nervous/anxious. The patient is not hyperactive. Physical Exam:  Visit Vitals  BP 97/59   Pulse (!) 49   Temp 97.9 °F (36.6 °C) (Oral)   Resp 16   Ht 5' 3\" (1.6 m)   Wt 150 lb (68 kg)   SpO2 99%   BMI 26.57 kg/m²       Wt Readings from Last 3 Encounters:   04/11/19 150 lb (68 kg)   03/28/19 156 lb (70.8 kg)   03/14/19 160 lb 3.2 oz (72.7 kg)     BP Readings from Last 3 Encounters:   04/11/19 97/59   03/28/19 (!) 89/62   03/14/19 114/70        Physical Exam   Constitutional: She is oriented to person, place, and time. She appears well-developed and well-nourished. No distress. HENT:   Head: Normocephalic and atraumatic. Mouth/Throat: Oropharynx is clear and moist.   Eyes: Conjunctivae are normal.   Cardiovascular: Normal rate and regular rhythm. Pulmonary/Chest: Effort normal and breath sounds normal. No respiratory distress. She has no wheezes. Neurological: She is alert and oriented to person, place, and time. She exhibits normal muscle tone. Coordination normal.   Skin: Skin is warm and dry. She is not diaphoretic. Psychiatric: She has a normal mood and affect. Her behavior is normal.   Nursing note and vitals reviewed. Assessment     46 y.o. female presents with anxiety and depression:  Patient Active Problem List   Diagnosis Code    Moderate episode of recurrent major depressive disorder ([facility] F33.1    Anxiety F41.9    Cardiac arrhythmia I49.9       Today's diagnoses are:    ICD-10-CM ICD-9-CM    1. Primary insomnia F51.01 307.42    2. Moderate episode of recurrent major depressive disorder (HCC) F33.1 296.32 buPROPion XL (WELLBUTRIN XL) 150 mg tablet   3. Anxiety F41.9 300.00               Plan     1. Anxiety, depression  - will add trial of Wellbutrin 150 mg daily  - continue Effexor 150 mg daily  - follow with psychiatry as scheduled    Follow up in 2-3 weeks    Prior labs and imaging were reviewed. I have discussed the diagnosis with the patient and the intended plan as seen in the above orders. The patient has received an after-visit summary and questions were answered concerning future plans. I have discussed medication side effects and warnings with the patient as well. Patient discussed with Dr. Kim Cuellar, Attending Physician.     Surinder Live MD, PGY3  Family Medicine Resident

## 2019-04-11 NOTE — PROGRESS NOTES
Chief Complaint   Patient presents with    Depression     1. Have you been to the ER, urgent care clinic since your last visit? Hospitalized since your last visit? No    2. Have you seen or consulted any other health care providers outside of the [address] [place] [address] since your last visit? Include any pap smears or colon screening.  No

## 2019-04-24 ENCOUNTER — OFFICE VISIT (OUTPATIENT)
Dept: FAMILY MEDICINE CLINIC | Age: 49
End: 2019-04-24

## 2019-04-24 VITALS
DIASTOLIC BLOOD PRESSURE: 65 MMHG | BODY MASS INDEX: 25.89 KG/M2 | HEART RATE: 65 BPM | WEIGHT: 146.15 LBS | OXYGEN SATURATION: 95 % | RESPIRATION RATE: 18 BRPM | TEMPERATURE: 98.5 F | SYSTOLIC BLOOD PRESSURE: 96 MMHG | HEIGHT: 63 IN

## 2019-04-24 DIAGNOSIS — F33.1 MODERATE EPISODE OF RECURRENT MAJOR DEPRESSIVE DISORDER (HCC): ICD-10-CM

## 2019-04-24 DIAGNOSIS — F51.01 PRIMARY INSOMNIA: ICD-10-CM

## 2019-04-24 DIAGNOSIS — F41.9 ANXIETY: Primary | ICD-10-CM

## 2019-04-24 RX ORDER — LIDOCAINE 50 MG/G
PATCH TOPICAL
Qty: 1 PACKAGE | Refills: 1 | Status: SHIPPED | OUTPATIENT
Start: 2019-04-24 | End: 2019-07-12 | Stop reason: SDUPTHER

## 2019-04-24 RX ORDER — HYDROCODONE BITARTRATE AND ACETAMINOPHEN 5; 325 MG/1; MG/1
TABLET ORAL
Refills: 0 | Status: CANCELLED | OUTPATIENT
Start: 2019-04-24

## 2019-04-24 RX ORDER — VENLAFAXINE HYDROCHLORIDE 150 MG/1
150 CAPSULE, EXTENDED RELEASE ORAL DAILY
Qty: 90 CAP | Refills: 1 | Status: SHIPPED | OUTPATIENT
Start: 2019-04-24 | End: 2019-09-13 | Stop reason: SDUPTHER

## 2019-04-24 RX ORDER — METOPROLOL TARTRATE 25 MG/1
25 TABLET, FILM COATED ORAL 2 TIMES DAILY
Qty: 180 TAB | Refills: 1 | Status: SHIPPED | OUTPATIENT
Start: 2019-04-24 | End: 2019-10-28 | Stop reason: SDUPTHER

## 2019-04-24 RX ORDER — BACLOFEN 10 MG/1
TABLET ORAL
Refills: 1 | Status: CANCELLED | OUTPATIENT
Start: 2019-04-24

## 2019-04-24 RX ORDER — BUPROPION HYDROCHLORIDE 300 MG/1
300 TABLET ORAL
Qty: 30 TAB | Refills: 0 | Status: SHIPPED | OUTPATIENT
Start: 2019-04-24 | End: 2019-05-23 | Stop reason: SDUPTHER

## 2019-04-24 RX ORDER — GABAPENTIN 300 MG/1
CAPSULE ORAL
Status: CANCELLED | OUTPATIENT
Start: 2019-04-24

## 2019-04-24 NOTE — PROGRESS NOTES
HPI     CC: follow up of depression    Gloria Mcdaniel is a 46 y.o. female with hx of depression, arrhythmia, and chronic back pain who presents for follow up of depression. At last appointment, Wellbutrin 150mg was started in addition to Effexor 150mg. Feeling slightly improved after wellbutrin. Tolerating well. No side effects. No hx of seizures or eating disorder. Is on metoprolol for an irregular heart rhythm. States that cardiac workup was negative by cardiology and was told to continue monitoring but no further evaluation was required. Requesting refill of metoprolol    Has chronic back pain with spinal stenosis. Has worked with physical therapy in the past; attempts to do the back stretching daily. Previously saw [facility], who referred to [facility] and was recommended water therapy with mild improvement. Is not interested in back surgery. States that steroid injections with minimal improvement with [facility]; follows with them every several months. No bowel or bladder incontinence. Requesting medication refill of norco, gabapentin, and baclofen. PMHx - Reviewed  Past Medical History:   Diagnosis Date    Abnormal mammogram 08/05/2016    MMG birads 3/cysts. - 6 month follow up needed    Abnormal mammogram of left breast 02/08/2018; 2/20/18    Left breast retroareolar focal asymmetry; normal, rec 6 month f/u    Abnormal Pap smear     Depressive disorder     History of bladder infections     History of mammogram 07/28/15    Small neodensity right breast for which diagnostic mammogram recommended. [facility]    PROM @ 29 wks-2#;? abruption;breech    Pap smear for cervical cancer screening 7/17/2010; 7/28/15; 6/11/18    normal; Negative; neg, hpv neg       Meds - Reviewed  Current Outpatient Medications   Medication Sig Dispense Refill    buPROPion XL (WELLBUTRIN XL) 300 mg XL tablet Take 1 Tab by mouth every morning.  30 Tab 0    venlafaxine-SR (EFFEXOR XR) 150 mg capsule Take 1 Cap by mouth daily. 90 Cap 1    metoprolol tartrate (LOPRESSOR) 25 mg tablet Take 1 Tab by mouth two (2) times a day. 180 Tab 1    gabapentin (NEURONTIN) 300 mg capsule gabapentin 300 mg capsule   Take 1 capsule 4 times a day by oral route after meals for 90 days.  baclofen (LIORESAL) 10 mg tablet TAKE 1 TABLET BY MOUTH TWICE A DAY AS NEEDED  1    HYDROcodone-acetaminophen (NORCO) 5-325 mg per tablet TAKE 1/2 TABLET TWICE A DAY AS NEEDED FOR PAIN  0    ethinyl estradiol-etonogestrel (NUVARING) 0.12-0.015 mg/24 hr vaginal ring Insert vaginally for 3 weeks;remove for 1 week. 3 Device 4       Allergies - Reviewed  No Known Allergies    Smoker - Reviewed  Social History     Tobacco Use   Smoking Status Never Smoker   Smokeless Tobacco Never Used       ETOH - Reviewed  Social History     Substance and Sexual Activity   Alcohol Use No       FH - Reviewed  Family History   Problem Relation Age of Onset    No Known Problems Mother     No Known Problems Father        ROS:  Review of Systems   Constitutional: Negative. Negative for activity change, appetite change, chills, diaphoresis, fatigue, fever and unexpected weight change. Respiratory: Negative for chest tightness, shortness of breath and wheezing. Cardiovascular: Negative for chest pain, palpitations and leg swelling. Gastrointestinal: Negative for abdominal pain, nausea and vomiting. Neurological: Negative for dizziness, seizures, light-headedness, numbness and headaches.        Physical Exam:  Visit Vitals  BP 96/65 (BP 1 Location: Left arm, BP Patient Position: Sitting)   Pulse 65   Temp 98.5 °F (36.9 °C) (Oral)   Resp 18   Ht 5' 3\" (1.6 m)   Wt 146 lb 2.4 oz (66.3 kg)   LMP 04/14/2019 (Exact Date)   SpO2 95%   BMI 25.89 kg/m²       Wt Readings from Last 3 Encounters:   04/24/19 146 lb 2.4 oz (66.3 kg)   04/11/19 150 lb (68 kg)   03/28/19 156 lb (70.8 kg)     BP Readings from Last 3 Encounters:   04/24/19 96/65   04/11/19 97/59   03/28/19 (!) 89/62        Physical Exam   Constitutional: She is oriented to person, place, and time. She appears well-developed and well-nourished. No distress. HENT:   Head: Normocephalic and atraumatic. Mouth/Throat: Oropharynx is clear and moist.   Eyes: Conjunctivae are normal. No scleral icterus. Cardiovascular: Normal rate and regular rhythm. Pulmonary/Chest: Effort normal and breath sounds normal. No respiratory distress. She has no wheezes. Musculoskeletal: Normal range of motion. She exhibits no edema or tenderness. Neurological: She is alert and oriented to person, place, and time. No sensory deficit. She exhibits normal muscle tone. Coordination normal.   Skin: Skin is warm and dry. Capillary refill takes less than 2 seconds. She is not diaphoretic. Psychiatric: She has a normal mood and affect. Her behavior is normal.   Nursing note and vitals reviewed. Assessment     46 y.o. female with depression, anxiety, cardiac arrhythmia presents for follow up   Patient Active Problem List   Diagnosis Code    Moderate episode of recurrent major depressive disorder ([facility] F33.1    Anxiety F41.9    Cardiac arrhythmia I49.9       Today's diagnoses are:    ICD-10-CM ICD-9-CM    1. Moderate episode of recurrent major depressive disorder ([facility]) F33.1 296.32 buPROPion XL (WELLBUTRIN XL) 300 mg XL tablet      venlafaxine-SR (EFFEXOR XR) 150 mg capsule              Plan     1. Anxiety, Depression -   - GAD7 score of 14  - continue Effexor 150mg. Trial of increased wellbutrin 300mg   - follow up in 2-3 weeks     2. Cardiac arrhythmia - completed cardiac workup and recommended to stay on metoprolol for rate control  - refill metoprolol 25mg BID     3. Chronic back pain - following with [facility].  appropriate  - declined to fill Norco, Baclofen, and Gabapentin, as pt gets medication from [facility].  Recommended she has this filled by one provider/ group; pt agreeable and understands  - trial of Lidocaine patch     Follow up in 2-3 weeks     Prior labs and imaging were reviewed. I have discussed the diagnosis with the patient and the intended plan as seen in the above orders. The patient has received an after-visit summary and questions were answered concerning future plans. I have discussed medication side effects and warnings with the patient as well. Patient discussed with Dr. Issa Huang, Attending Physician.     Dayron Arteaga MD, PGY3  Family Medicine Resident

## 2019-04-24 NOTE — PROGRESS NOTES
Nga Berumen is a 46 y.o. female  Chief Complaint   Patient presents with    Follow-up     patient states she is here today to follow up for depression    Medication Refill     patient is requesting a refill of all medications       Visit Vitals  BP 96/65 (BP 1 Location: Left arm, BP Patient Position: Sitting)   Pulse 65   Temp 98.5 °F (36.9 °C) (Oral)   Resp 18   Ht 5' 3\" (1.6 m)   Wt 146 lb 2.4 oz (66.3 kg)   LMP 04/14/2019 (Exact Date)   SpO2 95%   BMI 25.89 kg/m²     1. Have you been to the ER, urgent care clinic since your last visit? Hospitalized since your last visit? No    2. Have you seen or consulted any other health care providers outside of the [address] [place] [address] since your last visit? Include any pap smears or colon screening.  No  Health Maintenance Due   Topic Date Due    Shingrix Vaccine Age 50> (1 of 2) 07/09/2018    FOBT Q 1 YEAR AGE 50-75  07/09/2018    BREAST CANCER SCRN MAMMOGRAM  02/20/2019

## 2019-05-23 RX ORDER — BUPROPION HYDROCHLORIDE 300 MG/1
300 TABLET ORAL
Qty: 90 TAB | Refills: 0 | Status: SHIPPED | OUTPATIENT
Start: 2019-05-23 | End: 2019-08-12 | Stop reason: SDUPTHER

## 2019-07-16 RX ORDER — LIDOCAINE 50 MG/G
PATCH TOPICAL
Qty: 30 PATCH | Refills: 1 | Status: SHIPPED | OUTPATIENT
Start: 2019-07-16 | End: 2019-10-28

## 2019-09-06 DIAGNOSIS — Z01.419 ENCOUNTER FOR GYNECOLOGICAL EXAMINATION WITHOUT ABNORMAL FINDING: ICD-10-CM

## 2019-09-06 RX ORDER — ETONOGESTREL AND ETHINYL ESTRADIOL 11.7; 2.7 MG/1; MG/1
INSERT, EXTENDED RELEASE VAGINAL
Qty: 1 DEVICE | Refills: 0 | Status: SHIPPED | OUTPATIENT
Start: 2019-09-06 | End: 2019-10-28 | Stop reason: SDUPTHER

## 2019-09-06 NOTE — TELEPHONE ENCOUNTER
Patient calling, AE & Mammogram scheduled 10/28/19, needs 1 refill of nuvaring. rx sent per MD order, patient aware.

## 2019-09-11 DIAGNOSIS — F33.1 MODERATE EPISODE OF RECURRENT MAJOR DEPRESSIVE DISORDER (HCC): ICD-10-CM

## 2019-09-11 RX ORDER — BUPROPION HYDROCHLORIDE 300 MG/1
300 TABLET ORAL
Qty: 30 TAB | Refills: 0 | Status: SHIPPED | OUTPATIENT
Start: 2019-09-11 | End: 2019-09-13 | Stop reason: SDUPTHER

## 2019-09-11 NOTE — TELEPHONE ENCOUNTER
Dr. Melody Ho for Dr. Esvin Bolton      [phone]  Patient called to say the pharmacy told her they sent multiple requests for this medication last week and this week. She will need this soon before running out and she was advised the pharmacy requests were not reflecting in her chart as received.

## 2019-09-11 NOTE — TELEPHONE ENCOUNTER
Received refill request for wellbutrin  This was refilled x 30 days 8/2019 and pt was informed she needed f/u visit, none yet scheduled   I will refill another 30 days and ask staff to let patient know she needs to be seen   There will be no further refills without appt

## 2019-09-12 NOTE — PROGRESS NOTES
HPI       Chief Complaint: Anxiety followup     Donny Chavira is a 44 y.o. female who presents to f/u on anxiety     Anxiety/Depression - on effexor 150mg XR with the addition of wellbutrin 300mg XL since 4/2019, feels like it is helping significantly. Not seeing a therapist or counselor. Has not seen one since she was a teenager. Has thought about it and would be willing to start. No alcohol, tobacco, or other drug use. Happy on this dose of wellbutrin. Still having some issues with social/situational anxiety, has a hard time leaving the house. Avoiding the grocery store. Has never tried atarax or vistaril. PHQ-9:  1. Loss of pleasure in doing things? 1  2. Feeling down or depressed? 1  3. Trouble sleeping? 2  4. No energy or fatigue? 2  5. Poor appetite or overeating? 1  6. Feeling you have let others down or you are a failure? 2  7. Trouble concentrating? 2  8. Moving slowly or fidgety all the time? 2  9. Thinking you would be better off dead or thoughts of hurting yourself? 0    Total: 13 (moderate)      ARYAN-7:   1. Feeling nervous, anxious or on edge?  2  2. Not being able to stop or control worrying? 1  3. Worrying too much about different things? 2  4. Trouble relaxing? 2  5. Being so restless that it is hard to sit still? 2  6. Becoming easily annoyed or irritable? 1  7. Feeling afraid that something awful might happen? 2   Total: 12 (moderate)      Dysuria - hx of recurrent UTIs but has not had one in over 1 year. 3 days ago woke up with suprapubic pressure, mild dysuria, increased frequency, urgency, sensation of incomplete voiding. UA today with 1+ ketones. Drinks plenty of water/day. Took some of a leftover antibiotic she had at home the past 3 days, is unsure what antibiotic. Symptoms have improved since then, are very mild today. Pt desires flu vaccine today     PMHx:  Past Medical History:   Diagnosis Date    Abnormal mammogram 08/05/2016    MMG birads 3/cysts.  - 6 month follow up needed    Abnormal mammogram of left breast 02/08/2018; 2/20/18    Left breast retroareolar focal asymmetry; normal, rec 6 month f/u    Abnormal Pap smear     Depressive disorder     History of bladder infections     History of mammogram 07/28/15    Small neodensity right breast for which diagnostic mammogram recommended. Hammad    PROM @ 29 wks-2#;? abruption;breech    Pap smear for cervical cancer screening 7/17/2010; 7/28/15; 6/11/18    normal; Negative; neg, hpv neg     Meds:   Current Outpatient Medications   Medication Sig Dispense Refill    buPROPion XL (WELLBUTRIN XL) 300 mg XL tablet Take 1 Tab by mouth every morning. 90 Tab 1    venlafaxine-SR (EFFEXOR XR) 150 mg capsule Take 1 Cap by mouth daily. 90 Cap 1    hydrOXYzine pamoate (VISTARIL) 25 mg capsule Take 1 Cap by mouth three (3) times daily as needed for Anxiety. 30 Cap 2    ethinyl estradiol-etonogestrel (NUVARING) 0.12-0.015 mg/24 hr vaginal ring Insert vaginally for 3 weeks;remove for 1 week. 1 Device 0    lidocaine (LIDODERM) 5 % APPLY PATCH TO THE AFFECTED AREA FOR 12 HOURS A DAY AND REMOVE FOR 12 HOURS A DAY 30 Patch 1    metoprolol tartrate (LOPRESSOR) 25 mg tablet Take 1 Tab by mouth two (2) times a day. 180 Tab 1    gabapentin (NEURONTIN) 300 mg capsule gabapentin 300 mg capsule   Take 1 capsule 4 times a day by oral route after meals for 90 days.  baclofen (LIORESAL) 10 mg tablet TAKE 1 TABLET BY MOUTH TWICE A DAY AS NEEDED  1    HYDROcodone-acetaminophen (NORCO) 5-325 mg per tablet TAKE 1/2 TABLET TWICE A DAY AS NEEDED FOR PAIN  0     Allergies:   No Known Allergies    ROS  Review of Systems   Constitutional: Negative for chills, fever and weight loss. Respiratory: Negative for cough, shortness of breath and wheezing. Cardiovascular: Negative for chest pain and palpitations. Gastrointestinal: Negative for abdominal pain, constipation, diarrhea, nausea and vomiting.    Genitourinary: Positive for dysuria, frequency and urgency. Negative for flank pain and hematuria. Neurological: Negative for dizziness, weakness and headaches. Physical Exam:  Visit Vitals  /72 (BP 1 Location: Right arm, BP Patient Position: Sitting)   Pulse 78   Temp 98.7 °F (37.1 °C) (Oral)   Resp 18   Ht 5' 3\" (1.6 m)   Wt 142 lb 12.8 oz (64.8 kg)   LMP 09/08/2019 (Exact Date)   SpO2 97%   BMI 25.30 kg/m²       Wt Readings from Last 3 Encounters:   09/13/19 142 lb 12.8 oz (64.8 kg)   04/24/19 146 lb 2.4 oz (66.3 kg)   04/11/19 150 lb (68 kg)     BP Readings from Last 3 Encounters:   09/13/19 102/72   04/24/19 96/65   04/11/19 97/59      Physical Exam   Constitutional: She is oriented to person, place, and time. She appears well-developed and well-nourished. HENT:   Head: Normocephalic and atraumatic. Cardiovascular: Normal rate and regular rhythm. No murmur heard. Pulmonary/Chest: Effort normal and breath sounds normal. No respiratory distress. She has no wheezes. She has no rales. Abdominal: Soft. Bowel sounds are normal. She exhibits no distension. There is no tenderness. No CVA tenderness   Neurological: She is alert and oriented to person, place, and time. Skin: Skin is warm and dry. Psychiatric: She has a normal mood and affect. Vitals reviewed. I personally reviewed the following lab results:   Recent Results (from the past 12 hour(s))   AMB POC URINALYSIS DIP STICK AUTO W/O MICRO    Collection Time: 09/13/19  8:30 AM   Result Value Ref Range    Color (UA POC) Yellow     Clarity (UA POC) Clear     Glucose (UA POC) Negative Negative    Bilirubin (UA POC) 1+ Negative    Ketones (UA POC) 1+ Negative    Specific gravity (UA POC) 1.030 1.001 - 1.035    Blood (UA POC) Trace Negative    pH (UA POC) 5.5 4.6 - 8.0    Protein (UA POC) Trace Negative    Urobilinogen (UA POC) 1 mg/dL 0.2 - 1    Nitrites (UA POC) Negative Negative    Leukocyte esterase (UA POC) Negative Negative             Assessment     44 y.o. female with:    ICD-10-CM ICD-9-CM    1. Burning with urination R30.0 788.1 AMB POC URINALYSIS DIP STICK AUTO W/O MICRO      CULTURE, URINE   2. Moderate episode of recurrent major depressive disorder ([facility]) F33.1 296.32 buPROPion XL (WELLBUTRIN XL) 300 mg XL tablet      venlafaxine-SR (EFFEXOR XR) 150 mg capsule   3. Anxiety F41.9 300.00 hydrOXYzine pamoate (VISTARIL) 25 mg capsule   4. Encounter for immunization Z23 V03.89 INFLUENZA VIRUS VAC QUAD,SPLIT,PRESV FREE SYRINGE IM      VA IMMUNIZ ADMIN,1 SINGLE/COMB VAC/TOXOID              Plan     1. Burning with urination  UA not suggestive of UTI but pt is s/p 3 day course of unknown antibiotic she had leftover at home. Will send for urine culture. Discussed dangers of taking an antibiotic that is not prescribed for a specific illness. - AMB POC URINALYSIS DIP STICK AUTO W/O MICRO  - CULTURE, URINE    2. Moderate episode of recurrent major depressive disorder ([address]  Continue wellbutrin and effexor, scoring moderate anxiety/depression on PHQ-9 and GAD7 but pt is happy with current dose. - buPROPion XL (WELLBUTRIN XL) 300 mg XL tablet; Take 1 Tab by mouth every morning. Dispense: 90 Tab; Refill: 1  - venlafaxine-SR (EFFEXOR XR) 150 mg capsule; Take 1 Cap by mouth daily. Dispense: 90 Cap; Refill: 1    3. Anxiety  Trial vistaril PRN   - hydrOXYzine pamoate (VISTARIL) 25 mg capsule; Take 1 Cap by mouth three (3) times daily as needed for Anxiety. Dispense: 30 Cap; Refill: 2    4. Encounter for immunization  Flu vaccine administered today   - INFLUENZA VIRUS VAC QUAD,SPLIT,PRESV FREE SYRINGE IM  - VA IMMUNIZ ADMIN,1 SINGLE/COMB VAC/TOXOID      Follow-up and Dispositions    · Return in about 3 months (around 12/13/2019) for depression follow up. Return sooner if symptoms worsen or fail to improve.         Patient discussed with Dr. Marleny Salazar (attending physician)    I have discussed the diagnosis with the patient and the intended plan as seen in the above orders. The patient has received an after-visit summary and questions were answered concerning future plans. I have discussed medication side effects and warnings with the patient as well.     María Salgado MD  Family Medicine Resident  PGY-3

## 2019-09-13 ENCOUNTER — OFFICE VISIT (OUTPATIENT)
Dept: FAMILY MEDICINE CLINIC | Age: 49
End: 2019-09-13

## 2019-09-13 VITALS
DIASTOLIC BLOOD PRESSURE: 72 MMHG | RESPIRATION RATE: 18 BRPM | BODY MASS INDEX: 25.3 KG/M2 | TEMPERATURE: 98.7 F | OXYGEN SATURATION: 97 % | SYSTOLIC BLOOD PRESSURE: 102 MMHG | HEART RATE: 78 BPM | HEIGHT: 63 IN | WEIGHT: 142.8 LBS

## 2019-09-13 DIAGNOSIS — Z23 ENCOUNTER FOR IMMUNIZATION: ICD-10-CM

## 2019-09-13 DIAGNOSIS — R30.0 BURNING WITH URINATION: Primary | ICD-10-CM

## 2019-09-13 DIAGNOSIS — F41.9 ANXIETY: ICD-10-CM

## 2019-09-13 DIAGNOSIS — F33.1 MODERATE EPISODE OF RECURRENT MAJOR DEPRESSIVE DISORDER (HCC): ICD-10-CM

## 2019-09-13 LAB
BILIRUB UR QL STRIP: NORMAL
GLUCOSE UR-MCNC: NEGATIVE MG/DL
KETONES P FAST UR STRIP-MCNC: NORMAL MG/DL
PH UR STRIP: 5.5 [PH] (ref 4.6–8)
PROT UR QL STRIP: NORMAL
SP GR UR STRIP: 1.03 (ref 1–1.03)
UA UROBILINOGEN AMB POC: NORMAL (ref 0.2–1)
URINALYSIS CLARITY POC: CLEAR
URINALYSIS COLOR POC: YELLOW
URINE BLOOD POC: NORMAL
URINE LEUKOCYTES POC: NEGATIVE
URINE NITRITES POC: NEGATIVE

## 2019-09-13 RX ORDER — BUPROPION HYDROCHLORIDE 300 MG/1
300 TABLET ORAL
Qty: 90 TAB | Refills: 1 | Status: SHIPPED | OUTPATIENT
Start: 2019-09-13 | End: 2020-04-23 | Stop reason: SDUPTHER

## 2019-09-13 RX ORDER — VENLAFAXINE HYDROCHLORIDE 150 MG/1
150 CAPSULE, EXTENDED RELEASE ORAL DAILY
Qty: 90 CAP | Refills: 1 | Status: SHIPPED | OUTPATIENT
Start: 2019-09-13 | End: 2020-04-23 | Stop reason: SDUPTHER

## 2019-09-13 RX ORDER — HYDROXYZINE PAMOATE 25 MG/1
25 CAPSULE ORAL
Qty: 30 CAP | Refills: 2 | Status: SHIPPED | OUTPATIENT
Start: 2019-09-13 | End: 2019-10-28

## 2019-09-13 NOTE — PATIENT INSTRUCTIONS
Start taking vistaril AS NEEDED for anxiety. Continue effexor and wellbutrin daily    Call a therapist from the list below to schedule an appointment    I will call you if your urine culture grows a bacteria     [address] [facility] 33  [phone]    Kacie Rodriguez, Ph.D.  [place] Office  Phone: [phone]  FAX: [phone] [address]  Phone: [phone]  FAX: [phone]  [address] [facility], [address]  [address] [facility] Office  [phone]  FAX: [phone] [address] [place] [address], [facility] 33    [facility] Office  [phone]  FAX: [address] [facility], [address] [facility], [facility] [address]    The [facility] - Heidy Carson, Ph.D  [address], [address] [facility] [address]   [phone]    The [address] [facility] Office  [address] [facility]  [address], [address]  [phone]    [facility] Office  [address] , [address] [facility]  [phone]    [facility]  [place] ([place] near [facility])  [phone] [address] [facility] Location  [address], [place] [address]    [address] [facility]  [address] [place] [address], 9[address] [facility], [address]  [phone]    Bridget Masters (for residents of [facility])  [address] Address  [place] [address] [address]  Phone Numbers  [phone]   TDD [phone]   FAX [phone]  Crisis Intervention  24 Hours/Day   [phone] (for residents of [facility],  [facility], [facility], [place], [place], [place], and [facility])  Referral/Intake Services [phone]   Emergency Services (24 hrs 365 days) [phone]   (all clinics during business hours & [place] location for after hours)          Interstitial Cystitis: Care Instructions  Your Care Instructions    Interstitial cystitis is a long-term irritation of the bladder. It can cause mild to severe pain that comes and goes. You also may feel a sudden urge to urinate or need to urinate often. Sometimes the walls of the bladder become scarred or get stiff. Doctors do not know what causes interstitial cystitis. But they do know that it is not caused by an infection. The problem is much more common in women than in men. Your doctor may do tests to make sure that you do not have an infection, kidney stones, or bladder cancer. Because the cause of interstitial cystitis is not known, your doctor may try several treatments. It may take several weeks or months to find a treatment that works. If diet and lifestyle changes do not help, you may need medicine. Your doctor may also put liquid or medicine into your bladder for a short time to treat the pain. Follow-up care is a key part of your treatment and safety. Be sure to make and go to all appointments, and call your doctor if you are having problems. It's also a good idea to know your test results and keep a list of the medicines you take. How can you care for yourself at home? · Take your medicines exactly as prescribed. Call your doctor if you think you are having a problem with your medicine. · If your doctor prescribed antibiotics, take them as directed. Do not stop taking them just because you feel better. You need to take the full course of antibiotics. · Avoid eating spicy foods or high-acid foods, such as tomatoes and oranges, if these foods seem to make your pain worse. Also, limit caffeine and alcohol. · If a certain food seems to cause pain in your bladder, stop eating it to see if the pain goes away. · Do not smoke. Smoking can irritate the bladder and cause bladder cancer.  If you need help quitting, talk to your doctor about stop-smoking programs and medicines. These can increase your chances of quitting for good. · Try bladder training. Set certain times to go to the bathroom and slowly increase the time between visits. This may help lengthen the time your bladder can hold urine. · You might try a treatment called TENS. It sends a very mild electric current through wires placed near the pubic area. This is done for at least several minutes 2 times each day. · Consider a support group. Sharing your experiences with other people who have the same problem may help you learn more and cope better. · Wash your pubic area with a mild soap. Avoid deodorant soaps or soaps with heavy perfumes. · Wear loose-fitting clothing that does not put pressure on your bladder. When should you call for help? Call your doctor now or seek immediate medical care if:    · You have symptoms of a urinary infection. For example:  ? You have blood or pus in your urine. ? You have pain in your back just below your rib cage. This is called flank pain. ? You have a fever, chills, or body aches. ? It hurts to urinate. ? You have groin or belly pain.    Watch closely for changes in your health, and be sure to contact your doctor if:    · You do not get better as expected. Where can you learn more? Go to http://zohaib-chad.info/. Enter G628 in the search box to learn more about \"Interstitial Cystitis: Care Instructions. \"  Current as of: December 19, 2018  Content Version: 12.1  © 5724-2590 [employer]. Care instructions adapted under license by [employer] (which disclaims liability or warranty for this information). If you have questions about a medical condition or this instruction, always ask your healthcare professional. [place] [address] any warranty or liability for your use of this information.

## 2019-09-13 NOTE — PROGRESS NOTES
Identified pt with two pt identifiers(name and ). Reviewed record in preparation for visit and have obtained necessary documentation. Chief Complaint   Patient presents with    Medication Evaluation    Urinary Burning     x 3 days, patient states she took left over abx medication         Health Maintenance Due   Topic    Shingrix Vaccine Age 50> (1 of 2)    FOBT Q 1 YEAR AGE 54-65     BREAST CANCER SCRN MAMMOGRAM     Influenza Age 5 to Adult        Visit Vitals  /72 (BP 1 Location: Right arm, BP Patient Position: Sitting)   Pulse 78   Temp 98.7 °F (37.1 °C) (Oral)   Resp 18   Ht 5' 3\" (1.6 m)   Wt 142 lb 12.8 oz (64.8 kg)   SpO2 97%   BMI 25.30 kg/m²         Coordination of Care Questionnaire:  :   1) Have you been to an emergency room, urgent care, or hospitalized since your last visit? If yes, where when, and reason for visit? no       2. Have seen or consulted any other health care provider since your last visit? If yes, where when, and reason for visit? NO      3) Do you have an Advanced Directive/ Living Will in place? NO  If no, would you like information NO    Patient is accompanied by self I have received verbal consent from Mao Cedeño to discuss any/all medical information while they are present in the room.

## 2019-09-15 LAB — BACTERIA UR CULT: NORMAL

## 2019-10-16 NOTE — PROGRESS NOTES
[address] Medicine Residency Attending Addendum:  Patient encounter was discussed on the day of the encounter with Brendon Flores MD, performing the key elements of the service. I discussed the findings, assessment and plan with the resident and agree with the resident's findings and plan as documented in the resident's note.       Sonia Marquis MD, CAQSM, RMSK in an apartment , (+) elevator/alone

## 2019-10-28 ENCOUNTER — OFFICE VISIT (OUTPATIENT)
Dept: OBGYN CLINIC | Age: 49
End: 2019-10-28

## 2019-10-28 VITALS — SYSTOLIC BLOOD PRESSURE: 100 MMHG | WEIGHT: 144 LBS | DIASTOLIC BLOOD PRESSURE: 70 MMHG | BODY MASS INDEX: 25.51 KG/M2

## 2019-10-28 DIAGNOSIS — Z01.419 ENCOUNTER FOR GYNECOLOGICAL EXAMINATION WITHOUT ABNORMAL FINDING: ICD-10-CM

## 2019-10-28 DIAGNOSIS — Z12.39 SCREENING FOR BREAST CANCER: ICD-10-CM

## 2019-10-28 DIAGNOSIS — N94.9 VAGINAL BURNING: ICD-10-CM

## 2019-10-28 DIAGNOSIS — Z87.898 HISTORY OF ABNORMAL MAMMOGRAM: Primary | ICD-10-CM

## 2019-10-28 DIAGNOSIS — Z01.411 ENCOUNTER FOR GYNECOLOGICAL EXAMINATION (GENERAL) (ROUTINE) WITH ABNORMAL FINDINGS: ICD-10-CM

## 2019-10-28 LAB — WET MOUNT POCT, WMPOCT: NORMAL

## 2019-10-28 RX ORDER — METOPROLOL TARTRATE 50 MG/1
TABLET ORAL
Refills: 3 | COMMUNITY
Start: 2019-10-04

## 2019-10-28 RX ORDER — ETONOGESTREL AND ETHINYL ESTRADIOL 11.7; 2.7 MG/1; MG/1
INSERT, EXTENDED RELEASE VAGINAL
Qty: 3 DEVICE | Refills: 4 | Status: SHIPPED | OUTPATIENT
Start: 2019-10-28 | End: 2022-03-25

## 2019-10-28 NOTE — PROGRESS NOTES
[facility]  [email or web address]  [phone]    Gary Solis MD, [facility]       Annual Gynecologic Exam  [facility] 40-60  Chief Complaint   Patient presents with    Well Woman    Other     vaginal burning, itching       Gurdeep Blake is a 44 y.o.  WHITE OR   female who presents for an annual exam.  Patient's last menstrual period was 10/13/2019 (exact date). .    She does report additional concerns today. She reports that she has had vaginal burning/itching off and on for the past few months  PT didn't have 6mo breast fu, wants screening today. Menstrual status:  Her periods are light. She does not report dysmenorrhea/painful menses. She does not report irregular bleeding. Sexual history and Contraception:  Social History     Substance and Sexual Activity   Sexual Activity Yes    Partners: Male    Birth control/protection: Inserts       She does not reports new sexual partner(s) in the last year. The patient does not request STD testing. Preventive Medicine History:  Her most recent Pap smear result: normal was obtained in 2018    Her most recent HR HPV screen was Negative obtained in 2018    She does not have a history of CHARISMA 2, 3 or cervical cancer. Breast health:  Last mammogram: was abnormal: Left Breast asymmetry. Dx mammo 2018 was normal.  A mammogram was scheduled for today. Breast cancer family updated: see . Bone health: Galdino Urbano She does not have a history of osteopenia/osteoporosis. Osteoporosis family history updated: see . Past Medical History:   Diagnosis Date    Abnormal mammogram 2016    MMG birads 3/cysts.  - 6 month follow up needed    Abnormal mammogram of left breast 2018; 18    Left breast retroareolar focal asymmetry; normal, rec 6 month f/u    Abnormal Pap smear     Depressive disorder     History of bladder infections     History of mammogram 07/28/15    Small neodensity right breast for which diagnostic mammogram recommended. Hammad    PROM @ 29 wks-2#;? abruption;breech    Pap smear for cervical cancer screening 2010; 7/28/15; 18    normal; Negative; neg, hpv neg     OB History    Para Term  AB Living   5 4 3 1 1 3   SAB TAB Ectopic Molar Multiple Live Births   1         4      # Outcome Date GA Lbr Adam/2nd Weight Sex Delivery Anes PTL Lv   5 Term 11 40w0d  9 lb 3 oz (4.167 kg) F  UN  N WEST      Birth Comments: System Generated. Please review and update pregnancy details.    4 Term 10/17/08 39w0d  7 lb 3 oz (3.26 kg) M  LO  N WEST      Birth Comments: Refused FLMm at 40 weeks   3 Term 03/10/86 40w0d  7 lb 9 oz (3.43 kg) F  LO  N WEST   2  85 28w0d  1 lb 2 oz (0.51 kg) M LOW VERTICAL  Y ND      Birth Comments: Low vertical with myometrial extension   1 SAB                Past Surgical History:   Procedure Laterality Date    HX  SECTION  1985    x3-1st low vert/ext to myometrium; 6382;1965    HX OTHER SURGICAL      to cx     Family History   Problem Relation Age of Onset    No Known Problems Mother     No Known Problems Father      Social History     Socioeconomic History    Marital status:      Spouse name: Not on file    Number of children: Not on file    Years of education: Not on file    Highest education level: Not on file   Occupational History    Not on file   Social Needs    Financial resource strain: Not on file    Food insecurity:     Worry: Not on file     Inability: Not on file    Transportation needs:     Medical: Not on file     Non-medical: Not on file   Tobacco Use    Smoking status: Never Smoker    Smokeless tobacco: Never Used   Substance and Sexual Activity    Alcohol use: No    Drug use: No    Sexual activity: Yes     Partners: Male     Birth control/protection: Inserts   Lifestyle    Physical activity:     Days per week: Not on file     Minutes per session: Not on file    Stress: Not on file   Relationships    Social connections:     Talks on phone: Not on file     Gets together: Not on file     Attends Religion service: Not on file     Active member of club or organization: Not on file     Attends meetings of clubs or organizations: Not on file     Relationship status: Not on file    Intimate partner violence:     Fear of current or ex partner: Not on file     Emotionally abused: Not on file     Physically abused: Not on file     Forced sexual activity: Not on file   Other Topics Concern    Not on file   Social History Narrative    Not on file       No Known Allergies    Current Outpatient Medications   Medication Sig    metoprolol tartrate (LOPRESSOR) 50 mg tablet TAKE 1/2 TABLET BY MOUTH TWICE DAILY    buPROPion XL (WELLBUTRIN XL) 300 mg XL tablet Take 1 Tab by mouth every morning.  venlafaxine-SR (EFFEXOR XR) 150 mg capsule Take 1 Cap by mouth daily.  ethinyl estradiol-etonogestrel (NUVARING) 0.12-0.015 mg/24 hr vaginal ring Insert vaginally for 3 weeks;remove for 1 week.  gabapentin (NEURONTIN) 300 mg capsule gabapentin 300 mg capsule   Take 1 capsule 4 times a day by oral route after meals for 90 days.  hydrOXYzine pamoate (VISTARIL) 25 mg capsule Take 1 Cap by mouth three (3) times daily as needed for Anxiety.  lidocaine (LIDODERM) 5 % APPLY PATCH TO THE AFFECTED AREA FOR 12 HOURS A DAY AND REMOVE FOR 12 HOURS A DAY    baclofen (LIORESAL) 10 mg tablet TAKE 1 TABLET BY MOUTH TWICE A DAY AS NEEDED    HYDROcodone-acetaminophen (NORCO) 5-325 mg per tablet TAKE 1/2 TABLET TWICE A DAY AS NEEDED FOR PAIN     No current facility-administered medications for this visit.         Patient Active Problem List   Diagnosis Code    Moderate episode of recurrent major depressive disorder (HCC) F33.1    Anxiety F41.9    Cardiac arrhythmia I49.9       Review of Systems - History obtained from the patient  Constitutional: negative for weight loss, fever, night sweats  HEENT: negative for hearing loss, earache, congestion, snoring, sorethroat  CV: negative for chest pain, palpitations, edema  Resp: negative for cough, shortness of breath, wheezing  GI: negative for change in bowel habits, abdominal pain, black or bloody stools  : negative for frequency, dysuria, hematuria, vaginal discharge  MSK: negative for back pain, joint pain, muscle pain  Breast: negative for breast lumps, nipple discharge, galactorrhea  Skin :negative for itching, rash, hives  Neuro: negative for dizziness, headache, confusion, weakness  Psych: negative for anxiety, depression, change in mood  Heme/lymph: negative for bleeding, bruising, pallor      (WWE continued)     Physical Exam  Visit Vitals  /70   Wt 144 lb (65.3 kg)   LMP 10/13/2019 (Exact Date)   BMI 25.51 kg/m²       Constitutional  · Appearance: well-nourished, well developed, alert, in no acute distress    HENT  · Head and Face: appears normal    Neck  · Inspection/Palpation: normal appearance, no masses or tenderness  · Lymph Nodes: no lymphadenopathy present  · Thyroid: gland size normal, nontender, no nodules or masses present on palpation    Chest  · Respiratory Effort: breathing unlabored  · Auscultation: normal breath sounds    Cardiovascular  · Heart:  · Auscultation: regular rate and rhythm without murmur    Breasts  · Inspection of Breasts: breasts symmetrical, no skin changes, no discharge present, nipple appearance normal, no skin retraction present  · Palpation of Breasts and Axillae: no masses present on palpation, no breast tenderness  · Axillary Lymph Nodes: no lymphadenopathy present    Gastrointestinal  · Abdominal Examination: abdomen non-tender to palpation, normal bowel sounds, no masses present  · Liver and spleen: no hepatomegaly present, spleen not palpable  · Hernias: no hernias identified    Genitourinary  · External Genitalia: normal appearance for age, no discharge present, no tenderness present, no inflammatory lesions present, no masses present, without atrophy present  · Vagina: normal vaginal vault without central or paravaginal defects, no discharge present, no inflammatory lesions present, no masses present  · Bladder: non-tender to palpation  · Urethra: appears normal  · Cervix: normal   · Uterus: normal size, shape and consistency  · Adnexa: no adnexal tenderness present, no adnexal masses present  · Perineum: perineum within normal limits, no evidence of trauma, no rashes or skin lesions present  · Anus: anus within normal limits, no hemorrhoids present  · Inguinal Lymph Nodes: no lymphadenopathy present    Skin  · General Inspection: no rash, no lesions identified    Neurologic/Psychiatric  · Mental Status:  · Orientation: grossly oriented to person, place and time  · Mood and Affect: mood normal, affect appropriate    Assessment:  44 y.o. G5 [id number] for well woman exam  Encounter Diagnoses   Name Primary?  History of abnormal mammogram Yes    Screening for breast cancer     Encounter for gynecological examination without abnormal finding     Encounter for gynecological examination (general) (routine) with abnormal findings     Vaginal burning        Plan:  The patient was counseled about diet, exercise, healthy lifestyle  We discussed current self breast exam and mammogram recommendations  We discussed current pap smear and HR HPV testing guidelines  We recommend follow up in one year for routine annual gynecologic exam or sooner if needed  We recommend follow up with a primary care physician for any chronic medical problems or non-gynecologic concerns    We discussed calcium/vitamin D/weight bearing exercise and osteoporosis prevention  Handouts were given to the patient  We discussed potential causes of vaginal discharge/irritation. We discussed good vulvar hygiene. Recommended avoid vaginal irritants. Discussed use of mild soaps/detergents. Follow up if NI.   We reviewed wet prep findings with the patient at her visit. Patient will be notified about swab results and prescription sent, if indicated. Discussed risks, benefits and alternatives of OCP/nuvaring/patch: including but not limited to dvt/pe/mi/cva/ca/gi risks and that smoking, increasing age and other health conditions can increase these risks. Pt wants to continue with ring for now. Disc reevaluate r/b/a regularly. Folllow up:  [x] return for annual well woman exam in one year or sooner if she is having problems  [] follow up and ultrasound  [x] mammogram  [] 6 months  [] 3 months  [] 1 month    Orders Placed This Encounter    NUSWAB VAGINITIS    AMB POC WET PREP (AKA STAIN, INTERPRET, WET MOUNT)       Results for orders placed or performed in visit on 10/28/19   [facility] MAMMO BI SCREENING INCL CAD    Narrative    STUDY: Bilateral digital screening mammogram    INDICATION:  Screening. COMPARISON: 2018, [id number]    BREAST COMPOSITION:  The breasts are almost entirely fatty. FINDINGS: Bilateral digital screening mammography was performed and is  interpreted in conjunction with a computer assisted detection (CAD) system. No  suspicious masses or calcifications are identified. There has been no  significant change. Impression    IMPRESSION:  BI-RADS 1: Negative. No mammographic evidence of malignancy. RECOMMENDATIONS:  Next screening mammogram is recommended in one year. The patient will be notified of these results.

## 2019-10-28 NOTE — PATIENT INSTRUCTIONS

## 2019-10-30 LAB
A VAGINAE DNA VAG QL NAA+PROBE: ABNORMAL SCORE
BVAB2 DNA VAG QL NAA+PROBE: ABNORMAL SCORE
C ALBICANS DNA VAG QL NAA+PROBE: NEGATIVE
C GLABRATA DNA VAG QL NAA+PROBE: NEGATIVE
MEGA1 DNA VAG QL NAA+PROBE: ABNORMAL SCORE
T VAGINALIS RRNA SPEC QL NAA+PROBE: POSITIVE

## 2019-10-31 RX ORDER — METRONIDAZOLE 500 MG/1
TABLET ORAL
Qty: 4 TAB | Refills: 0 | Status: SHIPPED | OUTPATIENT
Start: 2019-10-31 | End: 2020-04-23 | Stop reason: ALTCHOICE

## 2019-10-31 NOTE — PROGRESS NOTES
Abnormal results. +trich  Rx; flagyl 2gram po x1 sent  Please notify pt. Update FS. This is a sexually transmitted disease. Intimate/sexual partner(s) need to be treated by their MD/PCP/clinic. The patient should notify them, or she can contact the health department for assistance. Rec abstinence until patient and partner(s) are treated +1 week. I recommend consistent condom use to decrease STD in the future. I recommend returning to see me to reevaluate infection in about three months. Please schedule appointment. I also recommend testing for other STDs if it hasn't been done recently: such as HIV/hepatitis and syphilis. She can RTC for lab work, or we can test at follow up visit.

## 2019-10-31 NOTE — PROGRESS NOTES
Patient aware of results and MD recommendations by [employer]. rx sent for flagyl to pharmacy, patient aware.

## 2020-04-21 ENCOUNTER — DOCUMENTATION ONLY (OUTPATIENT)
Dept: FAMILY MEDICINE CLINIC | Age: 50
End: 2020-04-21

## 2020-04-21 NOTE — PROGRESS NOTES
Reviewed fax from [phone]  Pt seen 3/18 for f/u with ALEJANDRA Cardenas    Chronic neck pain s/p C5-C6, C4-C5 right facet injection on 2/20/20. 80% relief but still having pain on left side. Using hydrocodone. Refills were provided.     Will place in [facility] kathleen Medley MD  3:09 PM

## 2020-04-23 ENCOUNTER — VIRTUAL VISIT (OUTPATIENT)
Dept: FAMILY MEDICINE CLINIC | Age: 50
End: 2020-04-23

## 2020-04-23 DIAGNOSIS — F33.42 RECURRENT MAJOR DEPRESSIVE DISORDER, IN FULL REMISSION (HCC): ICD-10-CM

## 2020-04-23 RX ORDER — VENLAFAXINE HYDROCHLORIDE 150 MG/1
150 CAPSULE, EXTENDED RELEASE ORAL DAILY
Qty: 90 CAP | Refills: 1 | Status: SHIPPED | OUTPATIENT
Start: 2020-04-23 | End: 2020-10-09 | Stop reason: SDUPTHER

## 2020-04-23 RX ORDER — BUPROPION HYDROCHLORIDE 300 MG/1
300 TABLET ORAL
Qty: 90 TAB | Refills: 1 | Status: SHIPPED | OUTPATIENT
Start: 2020-04-23 | End: 2020-10-09 | Stop reason: SDUPTHER

## 2020-04-23 RX ORDER — BACLOFEN 10 MG/1
TABLET ORAL
COMMUNITY
Start: 2020-04-01 | End: 2022-03-25

## 2020-04-23 RX ORDER — DIAZEPAM 5 MG/1
TABLET ORAL
COMMUNITY
End: 2022-03-25

## 2020-04-23 NOTE — PROGRESS NOTES
Fabiola Brown  44 y.o. female  1970  [address] [place] [address]  [id number]    [phone] (home) [phone] (work)     [address]:    Telephone Encounter  Brigid Turk MD       Encounter Date: 4/23/2020 at 1:00 PM  Number called: [phone] per appt notes     Consent:  She and/or the health care decision maker is aware that that she may receive a bill for this telephone service, depending on her insurance coverage, and has provided verbal consent to proceed: Yes    Chief Complaint   Patient presents with    Medication Refill     Depression       History of Present Illness   Fabiola Brown is a 44 y.o. female was evaluated by telephone. I communicated with the patient and/or health care decision maker about medication refills for depression. Requesting refill for wellbutrin and effexor. Patient has long hx of depression since her teens and has failed multiple medications previously. Her current regimen has been working well for her for > 1 year. No negative side effects. PHQ-9:   1. Loss of pleasure in doing things? 0  2. Feeling down or depressed? 1  3. Trouble sleeping? 1  4. No energy or fatigue? 0  5. Poor appetite or overeating? 0  6. Feeling you have let others down or you are a failure? 0  7. Trouble concentrating? 0  8. Moving slowly or fidgety all the time? 0  9. Thinking you would be better off dead or thoughts of hurting yourself? 0   Total: 2 (none)     ARYAN-7:   1. Feeling nervous, anxious or on edge?  0  2. Not being able to stop or control worrying? 0  3. Worrying too much about different things? 0  4. Trouble relaxing? 0  5. Being so restless that it is hard to sit still? 0  6. Becoming easily annoyed or irritable? 0  7. Feeling afraid that something awful might happen? 0   Total: 0 (none)    Medication list reviewed with patient.    - Gabapentin and norco prescribed by [facility], also has valium prescribed for her to take before spine injections 4/29  - Metoprolol prescribed by Cardio    Review of Systems   Review of Systems   Constitutional: Negative for chills and fever. Respiratory: Negative for shortness of breath. Cardiovascular: Negative for chest pain. Gastrointestinal: Negative for nausea and vomiting. Neurological: Negative for dizziness and headaches. Vitals/Objective:   General: Patient speaking in complete sentences without effort. Normal speech and cooperative. Due to this being a Virtual Check-in/Telephone evaluation, many elements of the physical examination are unable to be assessed. Assessment and Plan:     1. Recurrent major depressive disorder, in full remission [facility])  Patient doing well on medications, refills provided. Recommended she return in ~3 mos for CPE and bloodwork pending COVID pandemic. Pt in agreement w plan. - buPROPion XL (WELLBUTRIN XL) 300 mg XL tablet; Take 1 Tab by mouth every morning. Dispense: 90 Tab; Refill: 1  - venlafaxine-SR (Effexor XR) 150 mg capsule; Take 1 Cap by mouth daily. Dispense: 90 Cap; Refill: 1    We discussed the expected course, resolution and complications of the diagnosis(es) in detail. Medication risks, benefits, costs, interactions, and alternatives were discussed as indicated. I advised her to contact the office if her condition worsens, changes or fails to improve as anticipated. She expressed understanding with the diagnosis(es) and plan. Patient understands that this encounter was a temporary measure, and the importance of further follow up and examination was emphasized. Patient verbalized understanding. Patient informed to follow up: 3 months or sooner if needed    I affirm this is a Patient Initiated Episode with an Established Patient who has not had a related appointment within my department in the past 7 days or scheduled within the next 24 hours.   Note: not billable if this call serves to triage the patient into an appointment for the relevant concern      Electronically Signed: Christian Torre MD   Patient discussed with Dr. Kemi Dominguez (Attending)    Providers location when delivering service: Home    CPT:  77567 (5-10 minutes)  [phone] (11-20 minutes)  21  (21-30 minutes)    Medicare:  [address]      ICD-10-CM ICD-9-CM    1. Recurrent major depressive disorder, in full remission ([facility] [address] F33.42 296.36 buPROPion XL (WELLBUTRIN XL) 300 mg XL tablet      venlafaxine-SR (Effexor XR) 150 mg capsule       Pursuant to the emergency declaration under the [facility] waiver authority and the [employer] Act, this Virtual  Visit was conducted, with patient's consent, to reduce the patient's risk of exposure to COVID-19 and provide continuity of care for an established patient. History   Patients past medical, surgical and family histories were personally reviewed and updated. Past Medical History:   Diagnosis Date    Abnormal mammogram 2016    MMG birads 3/cysts. - 6 month follow up needed    Abnormal mammogram of left breast 2018; 18    Left breast retroareolar focal asymmetry; normal, rec 6 month f/u    Abnormal Pap smear     Depressive disorder     H/O mammogram 10/28/2019    negative bi-rads 1 (dense)    History of bladder infections     History of mammogram 07/28/15    Small neodensity right breast for which diagnostic mammogram recommended.    Hammad    PROM @ 34 wks-2#;? abruption;breech    Pap smear for cervical cancer screening 2010; 7/28/15; 18    normal; Negative; neg, hpv neg     Past Surgical History:   Procedure Laterality Date    HX  SECTION  1985    x3-1st low vert/ext to myometrium; 8933;6596    HX OTHER SURGICAL      to cx     Family History   Problem Relation Age of Onset    No Known Problems Mother     No Known Problems Father Social History     Socioeconomic History    Marital status:      Spouse name: Not on file    Number of children: Not on file    Years of education: Not on file    Highest education level: Not on file   Occupational History    Not on file   Social Needs    Financial resource strain: Not on file    Food insecurity     Worry: Not on file     Inability: Not on file    Transportation needs     Medical: Not on file     Non-medical: Not on file   Tobacco Use    Smoking status: Never Smoker    Smokeless tobacco: Never Used   Substance and Sexual Activity    Alcohol use: No    Drug use: No    Sexual activity: Yes     Partners: Male     Birth control/protection: Inserts   Lifestyle    Physical activity     Days per week: Not on file     Minutes per session: Not on file    Stress: Not on file   Relationships    Social connections     Talks on phone: Not on file     Gets together: Not on file     Attends [employer] service: Not on file     Active member of club or organization: Not on file     Attends meetings of clubs or organizations: Not on file     Relationship status: Not on file    Intimate partner violence     Fear of current or ex partner: Not on file     Emotionally abused: Not on file     Physically abused: Not on file     Forced sexual activity: Not on file   Other Topics Concern    Not on file   Social History Narrative    Not on file            Current Medications/Allergies   Medications and Allergies reviewed:    Current Outpatient Medications   Medication Sig Dispense Refill    buPROPion XL (WELLBUTRIN XL) 300 mg XL tablet Take 1 Tab by mouth every morning. 90 Tab 1    venlafaxine-SR (Effexor XR) 150 mg capsule Take 1 Cap by mouth daily. 90 Cap 1    baclofen (LIORESAL) 10 mg tablet TAKE 1 TABLET BY MOUTH TWICE A DAY AS NEEDED      diazePAM (VALIUM) 5 mg tablet diazepam 5 mg tablet   Take 1 tablet by mouth 1 hour before procedure. Do not drive after taking.       metoprolol tartrate (LOPRESSOR) 50 mg tablet TAKE 1/2 TABLET BY MOUTH TWICE DAILY  3    ethinyl estradiol-etonogestrel (NUVARING) 0.12-0.015 mg/24 hr vaginal ring Insert vaginally for 3 weeks;remove for 1 week. 3 Device 4    gabapentin (NEURONTIN) 300 mg capsule gabapentin 300 mg capsule   Take 1 capsule 4 times a day by oral route after meals for 90 days.       HYDROcodone-acetaminophen (NORCO) 5-325 mg per tablet TAKE 1/2 TABLET TWICE A DAY AS NEEDED FOR PAIN  0     No Known Allergies

## 2020-10-09 DIAGNOSIS — F33.42 RECURRENT MAJOR DEPRESSIVE DISORDER, IN FULL REMISSION (HCC): ICD-10-CM

## 2020-10-09 NOTE — TELEPHONE ENCOUNTER
----- Message from Salazar Parrish sent at 10/9/2020  2:09 PM EDT -----  Regarding: Dr. Edwin Minor (if not patient): n/a  Relationship of caller (if not patient): self  Best contact number(s): [phone]  Name of medication and dosage if known: Bupropion & Venlafaxtine  Is patient out of this medication (yes/no): yes 7 days  Pharmacy name: [facility] listed in chart? (yes/no): yes  Pharmacy phone number: [phone]  Date of last visit: 9/20  Details: Ms. Tres Chambers is requesting refills on her Rx \"Bupropion HCLXL 300mg & Venlagaxtine HCLER 150mg\".

## 2020-10-10 RX ORDER — BUPROPION HYDROCHLORIDE 300 MG/1
300 TABLET ORAL
Qty: 90 TAB | Refills: 1 | Status: SHIPPED | OUTPATIENT
Start: 2020-10-10 | End: 2021-04-21 | Stop reason: SDUPTHER

## 2020-10-10 RX ORDER — VENLAFAXINE HYDROCHLORIDE 150 MG/1
150 CAPSULE, EXTENDED RELEASE ORAL DAILY
Qty: 90 CAP | Refills: 1 | Status: SHIPPED | OUTPATIENT
Start: 2020-10-10 | End: 2021-04-21 | Stop reason: SDUPTHER

## 2020-10-13 ENCOUNTER — TRANSCRIBE ORDER (OUTPATIENT)
Dept: GENERAL RADIOLOGY | Age: 50
End: 2020-10-13

## 2020-10-13 ENCOUNTER — HOSPITAL ENCOUNTER (OUTPATIENT)
Dept: GENERAL RADIOLOGY | Age: 50
Discharge: HOME | End: 2020-10-13
Attending: NURSE PRACTITIONER
Payer: COMMERCIAL

## 2020-10-13 DIAGNOSIS — M25.551 RIGHT HIP PAIN: Primary | ICD-10-CM

## 2020-10-13 DIAGNOSIS — M25.551 RIGHT HIP PAIN: ICD-10-CM

## 2020-10-13 PROCEDURE — 72120 X-RAY BEND ONLY L-S SPINE: CPT

## 2020-10-13 PROCEDURE — 73502 X-RAY EXAM HIP UNI 2-3 VIEWS: CPT

## 2020-10-14 ENCOUNTER — TRANSCRIBE ORDER (OUTPATIENT)
Dept: SCHEDULING | Age: 50
End: 2020-10-14

## 2020-10-14 DIAGNOSIS — M25.551 RIGHT HIP PAIN: Primary | ICD-10-CM

## 2020-10-23 ENCOUNTER — HOSPITAL ENCOUNTER (OUTPATIENT)
Dept: MRI IMAGING | Age: 50
Discharge: HOME | End: 2020-10-23
Attending: NURSE PRACTITIONER
Payer: COMMERCIAL

## 2020-10-23 DIAGNOSIS — M25.551 RIGHT HIP PAIN: ICD-10-CM

## 2020-10-23 PROCEDURE — 72195 MRI PELVIS W/O DYE: CPT

## 2021-05-10 ENCOUNTER — VIRTUAL VISIT (OUTPATIENT)
Dept: FAMILY MEDICINE CLINIC | Age: 51
End: 2021-05-10
Payer: COMMERCIAL

## 2021-05-10 DIAGNOSIS — F33.42 RECURRENT MAJOR DEPRESSIVE DISORDER, IN FULL REMISSION (HCC): ICD-10-CM

## 2021-05-10 DIAGNOSIS — S42.302A CLOSED FRACTURE OF LEFT UPPER EXTREMITY, INITIAL ENCOUNTER: Primary | ICD-10-CM

## 2021-05-10 PROCEDURE — 99213 OFFICE O/P EST LOW 20 MIN: CPT | Performed by: STUDENT IN AN ORGANIZED HEALTH CARE EDUCATION/TRAINING PROGRAM

## 2021-05-10 RX ORDER — VENLAFAXINE HYDROCHLORIDE 150 MG/1
150 CAPSULE, EXTENDED RELEASE ORAL DAILY
Qty: 90 CAP | Refills: 1 | Status: SHIPPED | OUTPATIENT
Start: 2021-05-10 | End: 2021-11-04

## 2021-05-10 RX ORDER — BUPROPION HYDROCHLORIDE 300 MG/1
300 TABLET ORAL
Qty: 90 TAB | Refills: 1 | Status: SHIPPED | OUTPATIENT
Start: 2021-05-10 | End: 2021-11-04

## 2021-05-10 NOTE — PROGRESS NOTES
Logan Kim  44 y.o. female  1970  [address]  [id number]    [phone] (home) [phone] (work)     [address]:    Telephone Encounter  Guillermina Bai MD       Encounter Date: 5/10/2021 at 11:12 AM    Consent: Logan Kim, who was seen by synchronous (real-time) audio only technology, and/or her healthcare decision maker, is aware that this patient-initiated, Telehealth encounter on 5/10/2021 is a billable service, with coverage as determined by her insurance carrier. She is aware that she may receive a bill and has provided verbal consent to proceed: Yes. Chief Complaint   Patient presents with    Medication Refill     History of Present Illness   Logan Kim is a 44 y.o. female was evaluated by telephone. I communicated with the patient about a medication refill. Patient presents today for a refill of her Wellbutrin and Effexor which she has been on for several years. She has been tolerating them well, especially as a combination. She states that she felt \"bad\" prior to taking these medications but feels good now. She has no concerns with sleep, energy, concentration. She denies any SI/HI. She also states that last October she fell and broke her left upper arm and her pubic ramus in three different places. She never saw Ortho and has also been seeing her pain specialist. She would like to see Ortho. She does not think her left arm is functioning normally after the injury. She cannot fully lift it. She has not been to Physical Therapy. Discussed overdue annual CPE and lab work. Review of Systems   ROS    Vitals/Objective:   General: Patient speaking in complete sentences without effort. Normal speech and cooperative. Due to this being a Virtual Check-in/Telephone evaluation, many elements of the physical examination are unable to be assessed. Assessment and Plan:    Total Time: minutes: 11-20 minutes    1. Recurrent major depressive disorder, in full remission ([facility]  -stable, continue medication as prescribed   - venlafaxine-SR (Effexor XR) 150 mg capsule; Take 1 Cap by mouth daily. Dispense: 90 Cap; Refill: 1  - buPROPion XL (WELLBUTRIN XL) 300 mg XL tablet; Take 1 Tab by mouth every morning. Dispense: 90 Tab; Refill: 1    2. Closed fracture of left upper extremity, initial encounter  - placed: [place]    Patient informed to follow up: for annual visit     I affirm this is a Patient Initiated Episode with an Established Patient who has not had a related appointment within my department in the past 7 days or scheduled within the next 24 hours. Note: not billable if this call serves to triage the patient into an appointment for the relevant concern    Electronically Signed: Neeraj Chang MD  Providers location when delivering service: Home      ICD-10-CM ICD-9-CM    1. Closed fracture of left upper extremity, initial encounter  S42.302A 818.0 REFERRAL TO ORTHOPEDICS   2. Recurrent major depressive disorder, in full remission ([facility]  F33.42 296.36 venlafaxine-SR (Effexor XR) 150 mg capsule      buPROPion XL (WELLBUTRIN XL) 300 mg XL tablet       Pursuant to the emergency declaration under the [facility], [facility] waiver authority and the [employer] Act, this Virtual  Visit was conducted, with patient's consent, to reduce the patient's risk of exposure to COVID-19 and provide continuity of care for an established patient. History   Patients past medical, surgical and family histories were personally reviewed and updated. Past Medical History:   Diagnosis Date    Abnormal mammogram 08/05/2016    MMG birads 3/cysts.  - 6 month follow up needed    Abnormal mammogram of left breast 02/08/2018; 2/20/18    Left breast retroareolar focal asymmetry; normal, rec 6 month f/u    Abnormal Pap smear     Depressive disorder     H/O mammogram 10/28/2019    negative bi-rads 1 (dense)    History of bladder infections     History of mammogram 07/28/15    Small neodensity right breast for which diagnostic mammogram recommended. Hammad    PROM @ 34 wks-2#;? abruption;breech    Pap smear for cervical cancer screening 2010; 7/28/15; 18    normal; Negative; neg, hpv neg     Past Surgical History:   Procedure Laterality Date    HX  SECTION  1985    x3-1st low vert/ext to myometrium; ;9144    HX OTHER SURGICAL      to cx     Family History   Problem Relation Age of Onset    No Known Problems Mother     No Known Problems Father      Social History     Tobacco Use    Smoking status: Never Smoker    Smokeless tobacco: Never Used   Substance Use Topics    Alcohol use: No    Drug use: No              Current Medications/Allergies   Medications and Allergies reviewed:    Current Outpatient Medications   Medication Sig Dispense Refill    buPROPion XL (WELLBUTRIN XL) 300 mg XL tablet Take 1 Tab by mouth every morning. 30 Tab 0    venlafaxine-SR (Effexor XR) 150 mg capsule Take 1 Cap by mouth daily. 30 Cap 0    baclofen (LIORESAL) 10 mg tablet TAKE 1 TABLET BY MOUTH TWICE A DAY AS NEEDED      diazePAM (VALIUM) 5 mg tablet diazepam 5 mg tablet   Take 1 tablet by mouth 1 hour before procedure. Do not drive after taking.  metoprolol tartrate (LOPRESSOR) 50 mg tablet TAKE 1/2 TABLET BY MOUTH TWICE DAILY  3    ethinyl estradiol-etonogestrel (NUVARING) 0.12-0.015 mg/24 hr vaginal ring Insert vaginally for 3 weeks;remove for 1 week. 3 Device 4    gabapentin (NEURONTIN) 300 mg capsule gabapentin 300 mg capsule   Take 1 capsule 4 times a day by oral route after meals for 90 days.       HYDROcodone-acetaminophen (NORCO) 5-325 mg per tablet TAKE 1/2 TABLET TWICE A DAY AS NEEDED FOR PAIN  0     No Known Allergies

## 2021-06-11 ENCOUNTER — TRANSCRIBE ORDER (OUTPATIENT)
Dept: SCHEDULING | Age: 51
End: 2021-06-11

## 2021-06-11 DIAGNOSIS — M54.12 CERVICAL RADICULOPATHY: Primary | ICD-10-CM

## 2021-06-28 ENCOUNTER — HOSPITAL ENCOUNTER (OUTPATIENT)
Dept: MRI IMAGING | Age: 51
Discharge: HOME | End: 2021-06-28
Attending: NURSE PRACTITIONER
Payer: COMMERCIAL

## 2021-06-28 VITALS
DIASTOLIC BLOOD PRESSURE: 72 MMHG | RESPIRATION RATE: 13 BRPM | SYSTOLIC BLOOD PRESSURE: 110 MMHG | TEMPERATURE: 97.3 F | OXYGEN SATURATION: 99 % | HEART RATE: 70 BPM

## 2021-06-28 DIAGNOSIS — M54.12 CERVICAL RADICULOPATHY: ICD-10-CM

## 2021-06-28 PROCEDURE — 72141 MRI NECK SPINE W/O DYE: CPT

## 2021-06-28 PROCEDURE — 74011250636 HC RX REV CODE- 250/636: Performed by: RADIOLOGY

## 2021-06-28 PROCEDURE — 99153 MOD SED SAME PHYS/QHP EA: CPT

## 2021-06-28 PROCEDURE — 99152 MOD SED SAME PHYS/QHP 5/>YRS: CPT

## 2021-06-28 RX ORDER — MIDAZOLAM HYDROCHLORIDE 1 MG/ML
5 INJECTION, SOLUTION INTRAMUSCULAR; INTRAVENOUS
Status: DISCONTINUED | OUTPATIENT
Start: 2021-06-28 | End: 2021-06-28

## 2021-06-28 RX ORDER — FENTANYL CITRATE 50 UG/ML
100 INJECTION, SOLUTION INTRAMUSCULAR; INTRAVENOUS
Status: DISCONTINUED | OUTPATIENT
Start: 2021-06-28 | End: 2021-06-28

## 2021-06-28 RX ADMIN — MIDAZOLAM 1 MG: 1 INJECTION INTRAMUSCULAR; INTRAVENOUS at 08:48

## 2021-06-28 RX ADMIN — MIDAZOLAM 1 MG: 1 INJECTION INTRAMUSCULAR; INTRAVENOUS at 08:32

## 2021-06-28 RX ADMIN — MIDAZOLAM 1 MG: 1 INJECTION INTRAMUSCULAR; INTRAVENOUS at 08:29

## 2021-06-28 RX ADMIN — MIDAZOLAM 1 MG: 1 INJECTION INTRAMUSCULAR; INTRAVENOUS at 08:23

## 2021-06-28 RX ADMIN — MIDAZOLAM 1 MG: 1 INJECTION INTRAMUSCULAR; INTRAVENOUS at 08:14

## 2021-06-28 RX ADMIN — FENTANYL CITRATE 25 MCG: 50 INJECTION, SOLUTION INTRAMUSCULAR; INTRAVENOUS at 08:48

## 2021-06-28 RX ADMIN — MIDAZOLAM 1 MG: 1 INJECTION INTRAMUSCULAR; INTRAVENOUS at 08:18

## 2021-06-28 RX ADMIN — FENTANYL CITRATE 25 MCG: 50 INJECTION, SOLUTION INTRAMUSCULAR; INTRAVENOUS at 08:18

## 2021-06-28 RX ADMIN — FENTANYL CITRATE 25 MCG: 50 INJECTION, SOLUTION INTRAMUSCULAR; INTRAVENOUS at 08:14

## 2021-06-28 RX ADMIN — FENTANYL CITRATE 25 MCG: 50 INJECTION, SOLUTION INTRAMUSCULAR; INTRAVENOUS at 08:23

## 2021-06-28 RX ADMIN — FENTANYL CITRATE 25 MCG: 50 INJECTION, SOLUTION INTRAMUSCULAR; INTRAVENOUS at 08:29

## 2021-06-28 NOTE — PROGRESS NOTES
0710   Pt brought back to [facility] for scheduled MRI cervical spine with sedation. Confirmed NPO and ride. 0720  IV placed. [place] Bernheim Dr Christen Aguas in [facility] to discuss procedure and sedation plan with pt and RN. Allergies reviewed. Consent signed. 0810 Pt brought to MRI for procedure. Time out performed at  0814 . Procedure started at    and ended at  18 . Pt received a total of  6mg of versed and 125mcg of fentanyl over the course of procedure. Pt tolerated procedure  well. VS monitored throughout procedure. 0858  Pt returned to [facility]. Given juice. Joshuastad with  Guillecitlalli Louie,  to let him know procedure complete and pt doing well. [facility] discharge instructions with patient. Opportunity given for questions. Pt verbalized understanding. Copy provided. MRI disk given to patient. IV removed. Pt dressed.   6102  Escorted pt out to vehicle in w/c for discharge to home with  Joshua Palma.

## 2021-06-28 NOTE — DISCHARGE INSTRUCTIONS
Sedation for a Medical Procedure: After Your Visit  Instructions. Sedatives are used to relax you for a procedure. You may or may not be awake during the procedure. Common side effects of sedation medications include:                   Drowsiness, dizziness, euphoria, sleepiness or confusion. I              Unsteady gait. Loss of fine muscle control and delayed reaction time. Visual disturbances, difficulty focusing and blurred vision. Impaired memory recall. Follow-up care is a key component for your health and safety. Be sure to make and go to all medical appointments. Call your doctor if you are having problems. It's also a good idea to keep a list of your allergies, medicines with doses and test results on hand    Home care following your sedation procedure: You may experience some of these side effects or you may not be aware of subtle changes in your behavior or reaction time. Because you received these medications, we are giving you the following instructions: Activity    For your safety, you should not drive until the medicine wears off and you can think clearly and react easily. Do not drive for 24 hours.  Rest when you feel tired. Getting enough sleep will help you recover. Diet     You can eat your normal diet. If your stomach is upset, try bland, low-fat foods like plain rice, broiled chicken, toast, and yogurt.  Drink plenty of fluids unless your doctor advised you to limit your fluids.  Do not consume alcoholic beverages for 24 hours. Instructions   Do not make important personal, business, or legal decisions for 24 hours.  Move slowly and carefully, do not make sudden position changes.  Be alert for dizziness or lightheadedness and move accordingly.  Have a responsible person assist you.  Do not drive for 24 hours.  Do not operate equipment for 24 hours. SQMOS, power tools, kitchen appliances, etc.)    Discharge medications:   Resume prior to test medications as prescribed by your personal physician. If you have any questions or concerns call Radiology RN at [phone]  After hours call Radiology on-call at [phone], [facility]      Call 911 any time you think you may need emergency care. For example:                Call if you passed out (lost consciousness).  The medicine is not wearing off and you cannot think clearly. Watch closely for changes in your health, and be sure to contact your doctor if you have any problems. Where can you learn more? Go to [email or web address]   Enter G817 in the search box to learn more about \"Sedation for a Medical Procedure: After Your Visit. \"

## 2021-06-28 NOTE — PROGRESS NOTES
Interventional Radiology History and Physical (Outpatient)    6/28/2021    Patient: Dejah Kinsey 44 y.o. female     Referring Physician:  Fito Cote NP    Chief Complaint: neck pain    History of Present Illness: Neck pain and bilat hand numbness. Anxiety with MRI, requires sedation for MRI C spine. No resp complaints today. History:  Past Medical History:   Diagnosis Date    Abnormal mammogram 08/05/2016    MMG birads 3/cysts. - 6 month follow up needed    Abnormal mammogram of left breast 02/08/2018; 2/20/18    Left breast retroareolar focal asymmetry; normal, rec 6 month f/u    Abnormal Pap smear     Depressive disorder     H/O mammogram 10/28/2019    negative bi-rads 1 (dense)    History of bladder infections     History of mammogram 07/28/15    Small neodensity right breast for which diagnostic mammogram recommended.    Hammda    PROM @ 34 wks-2#;? abruption;breech    Pap smear for cervical cancer screening 7/17/2010; 7/28/15; 6/11/18    normal; Negative; neg, hpv neg     Family History   Problem Relation Age of Onset    No Known Problems Mother     No Known Problems Father      Social History     Socioeconomic History    Marital status:      Spouse name: Not on file    Number of children: Not on file    Years of education: Not on file    Highest education level: Not on file   Occupational History    Not on file   Tobacco Use    Smoking status: Never Smoker    Smokeless tobacco: Never Used   Substance and Sexual Activity    Alcohol use: No    Drug use: No    Sexual activity: Yes     Partners: Male     Birth control/protection: Inserts   Other Topics Concern    Not on file   Social History Narrative    Not on file     Social Determinants of Health     Financial Resource Strain:     Difficulty of Paying Living Expenses:    Food Insecurity:     Worried About Running Out of Food in the Last Year:     [address] in the Last Year:    Transportation Needs:     Lack of Transportation (Medical):  Lack of Transportation (Non-Medical):    Physical Activity:     Days of Exercise per Week:     Minutes of Exercise per Session:    Stress:     Feeling of Stress :    Social Connections:     Frequency of Communication with Friends and Family:     Frequency of Social Gatherings with Friends and Family:     Attends [employer] Services:     Active Member of Clubs or Organizations:     Attends Club or Organization Meetings:     Marital Status:    Intimate Partner Violence:     Fear of Current or Ex-Partner:     Emotionally Abused:     Physically Abused:     Sexually Abused: Allergies: No Known Allergies    Prior to Admission Medications:  Prior to Admission medications    Medication Sig Start Date End Date Taking? Authorizing Provider   venlafaxine-SR (Effexor XR) 150 mg capsule Take 1 Cap by mouth daily. 5/10/21  Yes Mo Menjivar MD   buPROPion XL (WELLBUTRIN XL) 300 mg XL tablet Take 1 Tab by mouth every morning. 5/10/21  Yes Mo Menjivar MD   baclofen (LIORESAL) 10 mg tablet TAKE 1 TABLET BY MOUTH TWICE A DAY AS NEEDED 4/1/20  Yes Provider, Historical   diazePAM (VALIUM) 5 mg tablet diazepam 5 mg tablet   Take 1 tablet by mouth 1 hour before procedure. Do not drive after taking. Yes Provider, Historical   metoprolol tartrate (LOPRESSOR) 50 mg tablet TAKE 1/2 TABLET BY MOUTH TWICE DAILY 10/4/19  Yes Provider, Historical   gabapentin (NEURONTIN) 300 mg capsule gabapentin 300 mg capsule   Take 1 capsule 4 times a day by oral route after meals for 90 days. 1/16/19  Yes Provider, Historical   HYDROcodone-acetaminophen (NORCO) 5-325 mg per tablet TAKE 1/2 TABLET TWICE A DAY AS NEEDED FOR PAIN 1/2/19  Yes Provider, Historical   ethinyl estradiol-etonogestrel (NUVARING) 0.12-0.015 mg/24 hr vaginal ring Insert vaginally for 3 weeks;remove for 1 week.   Patient not taking: Reported on 6/28/2021 10/28/19   Kayleen Saini MD       Physical Exam:    not currently breastfeeding. General: alert, cooperative, no distress, appears stated age  Heart: normal S1 and S2  Lungs: clear to auscultation bilaterally  Neuro: grossly intact  Extremities: extremities normal, atraumatic, no cyanosis or edema    Plan of Care/Planned Procedure:  Risks, benefits, and alternatives of MRI with minimal sedation reviewed with patient and she agrees to proceed with the procedure.      Jax Laughlin MD

## 2021-08-11 PROBLEM — G89.4 CHRONIC PAIN SYNDROME: Status: ACTIVE | Noted: 2021-08-11

## 2021-11-03 DIAGNOSIS — F33.42 RECURRENT MAJOR DEPRESSIVE DISORDER, IN FULL REMISSION (HCC): ICD-10-CM

## 2021-11-04 RX ORDER — VENLAFAXINE HYDROCHLORIDE 150 MG/1
CAPSULE, EXTENDED RELEASE ORAL
Qty: 90 CAPSULE | Refills: 1 | Status: SHIPPED | OUTPATIENT
Start: 2021-11-04 | End: 2022-03-18

## 2021-11-04 RX ORDER — BUPROPION HYDROCHLORIDE 300 MG/1
TABLET ORAL
Qty: 90 TABLET | Refills: 1 | Status: SHIPPED | OUTPATIENT
Start: 2021-11-04 | End: 2022-03-18

## 2022-03-17 DIAGNOSIS — F33.42 RECURRENT MAJOR DEPRESSIVE DISORDER, IN FULL REMISSION (HCC): ICD-10-CM

## 2022-03-18 RX ORDER — BUPROPION HYDROCHLORIDE 300 MG/1
TABLET ORAL
Qty: 30 TABLET | Refills: 1 | Status: SHIPPED | OUTPATIENT
Start: 2022-03-18 | End: 2022-05-19 | Stop reason: SDUPTHER

## 2022-03-18 RX ORDER — VENLAFAXINE HYDROCHLORIDE 150 MG/1
CAPSULE, EXTENDED RELEASE ORAL
Qty: 30 CAPSULE | Refills: 1 | Status: SHIPPED | OUTPATIENT
Start: 2022-03-18 | End: 2022-07-29

## 2022-03-19 PROBLEM — G89.4 CHRONIC PAIN SYNDROME: Status: ACTIVE | Noted: 2021-08-11

## 2022-03-19 PROBLEM — F41.9 ANXIETY: Status: ACTIVE | Noted: 2019-03-17

## 2022-03-19 PROBLEM — I49.9 CARDIAC ARRHYTHMIA: Status: ACTIVE | Noted: 2019-03-28

## 2022-03-19 PROBLEM — F33.1 MODERATE EPISODE OF RECURRENT MAJOR DEPRESSIVE DISORDER (HCC): Status: ACTIVE | Noted: 2019-03-17

## 2022-03-25 ENCOUNTER — OFFICE VISIT (OUTPATIENT)
Dept: FAMILY MEDICINE CLINIC | Age: 52
End: 2022-03-25
Payer: COMMERCIAL

## 2022-03-25 VITALS
RESPIRATION RATE: 16 BRPM | BODY MASS INDEX: 26.26 KG/M2 | OXYGEN SATURATION: 98 % | SYSTOLIC BLOOD PRESSURE: 107 MMHG | HEIGHT: 63 IN | DIASTOLIC BLOOD PRESSURE: 67 MMHG | HEART RATE: 80 BPM | WEIGHT: 148.2 LBS

## 2022-03-25 DIAGNOSIS — G56.03 BILATERAL CARPAL TUNNEL SYNDROME: ICD-10-CM

## 2022-03-25 DIAGNOSIS — Z12.31 ENCOUNTER FOR SCREENING MAMMOGRAM FOR MALIGNANT NEOPLASM OF BREAST: ICD-10-CM

## 2022-03-25 DIAGNOSIS — Z00.00 WELL WOMAN EXAM WITHOUT GYNECOLOGICAL EXAM: Primary | ICD-10-CM

## 2022-03-25 DIAGNOSIS — R21 RASH OF FOOT: ICD-10-CM

## 2022-03-25 DIAGNOSIS — Z12.11 ENCOUNTER FOR COLORECTAL CANCER SCREENING: ICD-10-CM

## 2022-03-25 DIAGNOSIS — Z12.12 ENCOUNTER FOR COLORECTAL CANCER SCREENING: ICD-10-CM

## 2022-03-25 DIAGNOSIS — Z11.59 ENCOUNTER FOR HEPATITIS C SCREENING TEST FOR LOW RISK PATIENT: ICD-10-CM

## 2022-03-25 DIAGNOSIS — F51.04 PSYCHOPHYSIOLOGICAL INSOMNIA: ICD-10-CM

## 2022-03-25 DIAGNOSIS — F41.9 ANXIETY: ICD-10-CM

## 2022-03-25 LAB
ALBUMIN SERPL-MCNC: 4.2 G/DL (ref 3.5–5)
ALBUMIN/GLOB SERPL: 1.3 {RATIO} (ref 1.1–2.2)
ALP SERPL-CCNC: 215 U/L (ref 45–117)
ALT SERPL-CCNC: 28 U/L (ref 12–78)
ANION GAP SERPL CALC-SCNC: 5 MMOL/L (ref 5–15)
AST SERPL-CCNC: 19 U/L (ref 15–37)
BILIRUB SERPL-MCNC: 0.2 MG/DL (ref 0.2–1)
BUN SERPL-MCNC: 11 MG/DL (ref 6–20)
BUN/CREAT SERPL: 12 (ref 12–20)
CALCIUM SERPL-MCNC: 9.5 MG/DL (ref 8.5–10.1)
CHLORIDE SERPL-SCNC: 104 MMOL/L (ref 97–108)
CHOLEST SERPL-MCNC: 208 MG/DL
CO2 SERPL-SCNC: 29 MMOL/L (ref 21–32)
CREAT SERPL-MCNC: 0.89 MG/DL (ref 0.55–1.02)
GLOBULIN SER CALC-MCNC: 3.2 G/DL (ref 2–4)
GLUCOSE SERPL-MCNC: 98 MG/DL (ref 65–100)
HCV AB SERPL QL IA: NONREACTIVE
HDLC SERPL-MCNC: 43 MG/DL
HDLC SERPL: 4.8 {RATIO} (ref 0–5)
LDLC SERPL CALC-MCNC: 116 MG/DL (ref 0–100)
POTASSIUM SERPL-SCNC: 4.7 MMOL/L (ref 3.5–5.1)
PROT SERPL-MCNC: 7.4 G/DL (ref 6.4–8.2)
SODIUM SERPL-SCNC: 138 MMOL/L (ref 136–145)
TRIGL SERPL-MCNC: 245 MG/DL (ref ?–150)
VLDLC SERPL CALC-MCNC: 49 MG/DL

## 2022-03-25 PROCEDURE — 99396 PREV VISIT EST AGE 40-64: CPT | Performed by: STUDENT IN AN ORGANIZED HEALTH CARE EDUCATION/TRAINING PROGRAM

## 2022-03-25 PROCEDURE — 99214 OFFICE O/P EST MOD 30 MIN: CPT | Performed by: STUDENT IN AN ORGANIZED HEALTH CARE EDUCATION/TRAINING PROGRAM

## 2022-03-25 PROCEDURE — 96156 HLTH BHV ASSMT/REASSESSMENT: CPT | Performed by: STUDENT IN AN ORGANIZED HEALTH CARE EDUCATION/TRAINING PROGRAM

## 2022-03-25 RX ORDER — KETOCONAZOLE 20 MG/G
CREAM TOPICAL DAILY
Qty: 30 G | Refills: 1 | Status: SHIPPED | OUTPATIENT
Start: 2022-03-25

## 2022-03-25 RX ORDER — TRAZODONE HYDROCHLORIDE 50 MG/1
50 TABLET ORAL
Qty: 30 TABLET | Refills: 1 | Status: SHIPPED | OUTPATIENT
Start: 2022-03-25 | End: 2022-06-08

## 2022-03-25 NOTE — PROGRESS NOTES
Chief Complaint   Patient presents with    Follow Up Chronic Condition     Patient is here to follow up on chronic conditions. Feels her anxiety and depression are getting worse.  Rash     C/o rash on top of her L foot. It has been there for about a year. It is itchy, patient states she sometimes will scratch it until it bleeds.      Visit Vitals  /67 (BP 1 Location: Left arm, BP Patient Position: Sitting, BP Cuff Size: Adult)   Pulse 80   Resp 16   Ht 5' 3\" (1.6 m)   Wt 148 lb 3.2 oz (67.2 kg)   SpO2 98%   BMI 26.25 kg/m²

## 2022-03-25 NOTE — PROGRESS NOTES
[address] [facility] [address] [place] [address]   Office [phone], Fax [phone]      Chief Complaint:     Chief Complaint   Patient presents with    Follow Up Chronic Condition     Patient is here to follow up on chronic conditions. Feels her anxiety and depression are getting worse.  Rash     C/o rash on top of her L foot. It has been there for about a year. It is itchy, patient states she sometimes will scratch it until it bleeds. Jemima Godoy is a 46 y.o. female that presents for: well woman, insomnia, rash foot       Assessment/Plan:       1. Well woman exam without gynecological exam  -     METABOLIC PANEL, COMPREHENSIVE; Future  -     HEPATITIS C AB; Future  -     LIPID PANEL; Future  2. Encounter for screening mammogram for malignant neoplasm of breast  -     GANESH MAMMO BI SCREENING INCL CAD; Future  3. Encounter for colorectal cancer screening  -     REFERRAL TO GASTROENTEROLOGY  4. Encounter for hepatitis C screening test for low risk patient  -     HEPATITIS C AB; Future  5. Anxiety  Assessment & Plan:  Stable on wellbutrin and effexor. Feel that insomnia is playing a large role in her anxiety   ARYAN 7 - 21 (severe)   PHQ- 17 (moderate)   Orders:  -     METABOLIC PANEL, COMPREHENSIVE; Future  -     [facility] BEHAVIOR ASSESSMENT/RE-ASSESSMENT  -     REFERRAL TO BEHAVIORAL HEALTH  6. Psychophysiological insomnia  Assessment & Plan:  Sounds severe. Only sleeping 2 hrs per night. Issues staying asleep and falling asleep   -avoid benzos   -start trazodone 50mg   -follow up 1 month   Orders:  -     traZODone (DESYREL) 50 mg tablet; Take 1 Tablet by mouth nightly for 30 days. , Normal, Disp-30 Tablet, R-1  -     REFERRAL TO BEHAVIORAL HEALTH  7. Rash of foot  Comments:  no relief with steroid cream   consider fungal source   start ketoconazole. f/u 1 month   Orders:  -     ketoconazole (NIZORAL) 2 % topical cream; Apply  to affected area daily.  Apply to affect area daily for 14 days or until rash resolves, Normal, Disp-30 g, R-1  8. Bilateral carpal tunnel syndrome  Comments:  advised stopping gabapentin if this doesn't help and moving forward with carpal tunnel release surgery          Follow up: Follow-up and Dispositions    · Return in about 1 month (around 4/25/2022) for rash and insomnia . Subjective:   HPI:  Bhumika Redman is a 46 y.o. female that presents for: well woman and other issues. Does not need pap. Needs giuseppe order and referral to GI for colonoscopy. Psychiatric disorder dx: Anxiety and depression   Current meds: Wellbutrin 300mg XL daily, Effexor 150mg daily (on meds for several years). Seems to be doing okay ut sleep is a huge issue and makes her anxiety worse. History of psych admission:no  Prior Medications tried: Zoloft (didn't help)   History of manic or hypomanic episode:no  History of Suicide: no  History of post partum depression: no  Substance use: none (used to drink heavily- stopped 1 year ago), no drugs   LMP:stopped 1 year ago   SI: no  HI: no  AVH: no    Insomnia   always an issue- trouble falling asleep and staying asleep. Has tried Melatonin. Only gets about 2 hours of sleep. Makes the anxiety worse. Radha Engle asleep in kids pick line a school last week bc she was so fatigued. Carpal tunnel & neuropathy   Was given gabapentin BID without much relief. Severe carpal tunnel noted on nerve conduction. Supposed to get surgery but is scared to have this. Rash   -Noted on Lt foot only. Small patch. Very itchy   -Present 1 year   -Getting little bigger.   -Tried hydrocortisone and alchol. And triamcinolone. No relief.         Health Maintenance:  Health Maintenance Due   Topic Date Due    Hepatitis C Screening  Never done    Depression Monitoring  Never done    Lipid Screen  Never done    Colorectal Cancer Screening Combo  Never done    Breast Cancer Screen Mammogram  10/28/2021    Shingrix Vaccine Age 50> (2 of 2) 12/18/2021        ROS:   Review of Systems   Constitutional: Negative for chills, fever and malaise/fatigue. Eyes: Negative for blurred vision and double vision. Respiratory: Negative for shortness of breath. Cardiovascular: Negative for chest pain and palpitations. Gastrointestinal: Negative for abdominal pain, diarrhea, nausea and vomiting. Musculoskeletal: Negative for myalgias. Skin: Positive for rash (Lt foot ). Neurological: Negative for dizziness, tremors and headaches. Psychiatric/Behavioral: Negative for depression, hallucinations, substance abuse and suicidal ideas. The patient is nervous/anxious and has insomnia. Past medical history, social history, and medications personally reviewed. Past Medical History:   Diagnosis Date    Abnormal mammogram 08/05/2016    MMG birads 3/cysts. - 6 month follow up needed    Abnormal mammogram of left breast 02/08/2018; 2/20/18    Left breast retroareolar focal asymmetry; normal, rec 6 month f/u    Abnormal Pap smear     Chronic pain syndrome     followed by [facility] and [address] Jerrica Cardenas, NP    Depressive disorder     H/O mammogram 10/28/2019    negative bi-rads 1 (dense)    History of bladder infections     History of mammogram 07/28/15    Small neodensity right breast for which diagnostic mammogram recommended. Hammad    PROM @ 29 wks-2#;? abruption;breech    Pap smear for cervical cancer screening 7/17/2010; 7/28/15; 6/11/18    normal; Negative; neg, hpv neg        Allergies personally reviewed. No Known Allergies       Objective:   Vitals reviewed. Visit Vitals  /67 (BP 1 Location: Left arm, BP Patient Position: Sitting, BP Cuff Size: Adult)   Pulse 80   Resp 16   Ht 5' 3\" (1.6 m)   Wt 148 lb 3.2 oz (67.2 kg)   SpO2 98%   BMI 26.25 kg/m²        Physical Exam  Physical Exam  Vitals and nursing note reviewed. HENT:      Head: Normocephalic and atraumatic.    Eyes: Conjunctiva/sclera: Conjunctivae normal.   Cardiovascular:      Rate and Rhythm: Normal rate and regular rhythm. Pulses:           Dorsalis pedis pulses are 2+ on the right side and 2+ on the left side. Posterior tibial pulses are 2+ on the right side and 2+ on the left side. Heart sounds: Normal heart sounds. No murmur heard. No friction rub. No gallop. Pulmonary:      Effort: Pulmonary effort is normal. No respiratory distress. Breath sounds: Normal breath sounds. No wheezing. Musculoskeletal:         General: Normal range of motion. Cervical back: Normal range of motion and neck supple. Right lower leg: No edema. Left lower leg: No edema. Skin:     General: Skin is warm and dry. Findings: Rash (1 inch x 1inch round slightly irregular shaped erythematous bumpy rash on Lt foot ) present. Neurological:      General: No focal deficit present. Mental Status: She is alert. Psychiatric:         Attention and Perception: Attention normal.         Mood and Affect: Mood and affect normal.         Speech: Speech normal.         Behavior: Behavior normal.              Pt was discussed with Dr Linda Bean  (attending physician). I have reviewed pertinent labs results and other data. I have discussed the diagnosis with the patient and the intended plan as seen in the above orders. The patient has received an after-visit summary and questions were answered concerning future plans. I have discussed medication side effects and warnings with the patient as well.     Sarah Krishna, DO  Resident [address] [facility]  03/25/22

## 2022-03-25 NOTE — ASSESSMENT & PLAN NOTE
Sounds severe. Only sleeping 2 hrs per night.   Issues staying asleep and falling asleep   -avoid benzos   -start trazodone 50mg   -follow up 1 month

## 2022-03-25 NOTE — ASSESSMENT & PLAN NOTE
Stable on wellbutrin and effexor.   Feel that insomnia is playing a large role in her anxiety   ARYAN 7 - 21 (severe)   PHQ- 17 (moderate)

## 2022-05-19 DIAGNOSIS — F33.42 RECURRENT MAJOR DEPRESSIVE DISORDER, IN FULL REMISSION (HCC): ICD-10-CM

## 2022-05-19 RX ORDER — BUPROPION HYDROCHLORIDE 300 MG/1
300 TABLET ORAL DAILY
Qty: 90 TABLET | Refills: 3 | Status: SHIPPED | OUTPATIENT
Start: 2022-05-19 | End: 2022-07-17

## 2022-05-19 NOTE — TELEPHONE ENCOUNTER
Good Afternoon ,  I received a message stating that the patient is requesting a refill for;  buPROPion XL (WELLBUTRIN XL) 300 mg XL tablet  To be sent to the pharmacy. Can you help me with this please?   Thank you

## 2022-05-20 ENCOUNTER — TELEPHONE (OUTPATIENT)
Dept: FAMILY MEDICINE CLINIC | Age: 52
End: 2022-05-20

## 2022-05-20 NOTE — TELEPHONE ENCOUNTER
[address] called and said if you can lower the dosage of the Wellbutrin to 100mg unless it is okay for her to take the 300mg. Please let me know what the patient is able to take so I can give them a liang back.

## 2022-07-16 DIAGNOSIS — F33.42 RECURRENT MAJOR DEPRESSIVE DISORDER, IN FULL REMISSION (HCC): ICD-10-CM

## 2022-07-17 RX ORDER — BUPROPION HYDROCHLORIDE 300 MG/1
TABLET ORAL
Qty: 30 TABLET | Refills: 1 | Status: SHIPPED | OUTPATIENT
Start: 2022-07-17 | End: 2022-07-29 | Stop reason: SDUPTHER

## 2022-07-28 NOTE — PROGRESS NOTES
Steve Monroy  45 y.o. female  1970  [address] [facility] [address]  [id number]   [address]:    Telemedicine Progress Note  Que Foley DO       Encounter Date and Time: July 29, 2022 at 10:06 AM    Consent: Steve Monroy, who was seen by synchronous (real-time) audio-video technology, and/or her healthcare decision maker, is aware that this patient-initiated, Telehealth encounter on 7/29/2022 is a billable service, with coverage as determined by her insurance carrier. She is aware that she may receive a bill and has provided verbal consent to proceed: Yes. Chief Complaint   Patient presents with    Anxiety    Depression    Medication Refill       History of Present Illness   Steve Monroy is a 45 y.o. female was evaluated by synchronous (real-time) audio-video technology from home, through a secure patient portal.    Anxiety / Depression / Insomnia:   - Current meds: Wellbutrin  mg daily, Effexor  mg daily, Trazodone 50 mg qhs   - Prior meds tried: Zoloft (didn't work), melatonin  - Recently has been feeling more down/depressed and anxious   - Sleep: improved some after starting Trazodone. Able to fall asleep but still wakes up at night and can't fall back asleep. On average sleeps 3-5 hours at night.    - Interest decreased: yes  - Guilt and/or feelings of worthlessness: yes, low motivation   - Energy decreased: yes, feels tired/worn down  - Concentration problems: yes  - Appetite/weight changes: no  - Psychomotor agitation / retardation: no  - SI: none currently, as a child yes  - HI: no  - Auditory or visual hallucinations: no  - Excessive anxiety and/or worrying: yes  - History of psych admission: yes, as a child   - History of manic or hypomanic episode: no  - Substance use: none, used to drink heavily but quit drinking a couple years ago   - Not interested in seeing a counselor/therapist     Review of Systems   Review of Systems   Constitutional:  Positive for malaise/fatigue. Negative for fever and weight loss. HENT:  Negative for congestion and sore throat. Respiratory:  Negative for cough and shortness of breath. Cardiovascular:  Negative for chest pain and leg swelling. Gastrointestinal:  Negative for abdominal pain, nausea and vomiting. Musculoskeletal:  Positive for back pain (chronic). Skin:  Negative for rash. Neurological:  Negative for dizziness, weakness and headaches. Psychiatric/Behavioral:  Positive for depression. Negative for hallucinations, substance abuse and suicidal ideas. The patient is nervous/anxious and has insomnia. Vitals/Objective:     General: alert, cooperative, no distress   Mental  status: mental status: alert, oriented to person, place, and time, depressed mood   Resp: resp: normal effort and no respiratory distress   Neuro: neuro: no gross deficits   Skin: skin: no discoloration or lesions of concern on visible areas   Due to this being a TeleHealth evaluation, many elements of the physical examination are unable to be assessed. Assessment and Plan:   1. Moderate episode of recurrent major depressive disorder (HCC) - Previously stable but with recent worsening of depression and anxiety. No SI/HI. Will increase Effexor to 187.5 mg daily (one 150 mg capsule and one 37.5 mg capsule daily) and continue current doses of Wellbutrin  mg daily and Trazodone 50 mg qhs. She declined referral to counseling/therapy at this time. Sending medication refills. Follow up in 2 weeks. - venlafaxine-SR (EFFEXOR-XR) 150 mg capsule; Take 1 Capsule by mouth in the morning. Take 1 capsule (150 mg) daily along with Effexor XR 37.5 mg daily for a total daily dose of 187.5 mg daily. Dispense: 30 Capsule; Refill: 1  - venlafaxine-SR (EFFEXOR-XR) 37.5 mg capsule; Take 1 Capsule by mouth in the morning.  Take 1 capsule (37.5 mg) daily along with Effexor  mg daily for a total daily dose of 187.5 mg daily. Indications: anxiousness associated with depression  Dispense: 30 Capsule; Refill: 1  - buPROPion XL (WELLBUTRIN XL) 300 mg XL tablet; Take 1 Tablet by mouth in the morning. Indications: anxiousness associated with depression  Dispense: 90 Tablet; Refill: 1    2. Generalized anxiety disorder - see above   - venlafaxine-SR (EFFEXOR-XR) 150 mg capsule; Take 1 Capsule by mouth in the morning. Take 1 capsule (150 mg) daily along with Effexor XR 37.5 mg daily for a total daily dose of 187.5 mg daily. Dispense: 30 Capsule; Refill: 1  - venlafaxine-SR (EFFEXOR-XR) 37.5 mg capsule; Take 1 Capsule by mouth in the morning. Take 1 capsule (37.5 mg) daily along with Effexor  mg daily for a total daily dose of 187.5 mg daily. Indications: anxiousness associated with depression  Dispense: 30 Capsule; Refill: 1    3. Psychophysiological insomnia - see above   - traZODone (DESYREL) 50 mg tablet; TAKE 1 TABLET BY MOUTH EVERY DAY NIGHTLY  Indications: insomnia associated with depression, major depressive disorder  Dispense: 90 Tablet; Refill: 1      Time spent in direct conversation with the patient to include medical condition(s) discussed, assessment and treatment plan:       We discussed the expected course, resolution and complications of the diagnosis(es) in detail. Medication risks, benefits, costs, interactions, and alternatives were discussed as indicated. I advised her to contact the office if her condition worsens, changes or fails to improve as anticipated. She expressed understanding with the diagnosis(es) and plan. Patient understands that this encounter was a temporary measure, and the importance of further follow up and examination was emphasized. Patient verbalized understanding. Patient informed to follow up: in 2 weeks. Electronically Signed: Camilla Cedeño DO    CPT Codes 47362-06567 for Established Patients may apply to this Telehealth Visit. POS code: 18.   Modifier SHERMAN Gutierrez is a 45 y.o. female who was evaluated by an audio-video encounter for concerns as above. Patient identification was verified prior to start of the visit. A caregiver was present when appropriate. Due to this being a TeleHealth encounter (During GGLTQ-54 public health emergency), evaluation of the following organ systems was limited: Vitals/Constitutional/EENT/Resp/CV/GI//MS/Neuro/Skin/Heme-Lymph-Imm. Pursuant to the emergency declaration under the [facility], [facility] waiver authority and the [employer] Act, this Virtual Visit was conducted, with patient's (and/or legal guardian's) consent, to reduce the patient's risk of exposure to COVID-19 and provide necessary medical care. Services were provided through a synchronous discussion virtually to substitute for in-person clinic visit. I was at home. The patient was at home. History   Patients past medical, surgical and family histories were reviewed and updated. Past Medical History:   Diagnosis Date    Abnormal mammogram 2016    MMG birads 3/cysts. - 6 month follow up needed    Abnormal mammogram of left breast 2018; 18    Left breast retroareolar focal asymmetry; normal, rec 6 month f/u    Abnormal Pap smear     Chronic pain syndrome     followed by [facility] and [address], NP    Depressive disorder     H/O mammogram 10/28/2019    negative bi-rads 1 (dense)    History of bladder infections     History of mammogram 07/28/15    Small neodensity right breast for which diagnostic mammogram recommended.     [address]    PROM @ 29 wks-2#;? abruption;breech    Pap smear for cervical cancer screening 2010; 7/28/15; 18    normal; Negative; neg, hpv neg     Past Surgical History:   Procedure Laterality Date    HX  SECTION  1985    x3-1st low vert/ext to myometrium; 4471;1215    HX OTHER SURGICAL      to cx     Family History   Problem Relation Age of Onset    No Known Problems Mother     No Known Problems Father      Social History     Socioeconomic History    Marital status:      Spouse name: Not on file    Number of children: Not on file    Years of education: Not on file    Highest education level: Not on file   Occupational History    Not on file   Tobacco Use    Smoking status: Never    Smokeless tobacco: Never   Substance and Sexual Activity    Alcohol use: No    Drug use: No    Sexual activity: Yes     Partners: Male     Birth control/protection: Inserts   Other Topics Concern    Not on file   Social History Narrative    Not on file     Social Determinants of Health     Financial Resource Strain: Not on file   Food Insecurity: Not on file   Transportation Needs: Not on file   Physical Activity: Not on file   Stress: Not on file   Social Connections: Not on file   Intimate Partner Violence: Not on file   Housing Stability: Not on file     Patient Active Problem List   Diagnosis Code    Moderate episode of recurrent major depressive disorder (HCC) F33.1    Anxiety F41.9    Cardiac arrhythmia I49.9    Chronic pain syndrome G89.4    Psychophysiological insomnia F51.04    Bilateral carpal tunnel syndrome G56.03          Current Medications/Allergies   Medications and Allergies reviewed:    Current Outpatient Medications   Medication Sig Dispense Refill    pregabalin (LYRICA) 150 mg capsule Take 150 mg by mouth in the morning. venlafaxine-SR (EFFEXOR-XR) 150 mg capsule Take 1 Capsule by mouth in the morning. Take 1 capsule (150 mg) daily along with Effexor XR 37.5 mg daily for a total daily dose of 187.5 mg daily. 30 Capsule 1    venlafaxine-SR (EFFEXOR-XR) 37.5 mg capsule Take 1 Capsule by mouth in the morning. Take 1 capsule (37.5 mg) daily along with Effexor  mg daily for a total daily dose of 187.5 mg daily.   Indications: anxiousness associated with depression 30 Capsule 1    buPROPion XL (WELLBUTRIN XL) 300 mg XL tablet Take 1 Tablet by mouth in the morning. Indications: anxiousness associated with depression 90 Tablet 1    traZODone (DESYREL) 50 mg tablet TAKE 1 TABLET BY MOUTH EVERY DAY NIGHTLY  Indications: insomnia associated with depression, major depressive disorder 90 Tablet 1    metoprolol tartrate (LOPRESSOR) 50 mg tablet TAKE 1/2 TABLET BY MOUTH TWICE DAILY  3    ketoconazole (NIZORAL) 2 % topical cream Apply  to affected area daily.  Apply to affect area daily for 14 days or until rash resolves 30 g 1     No Known Allergies

## 2022-07-29 ENCOUNTER — VIRTUAL VISIT (OUTPATIENT)
Dept: FAMILY MEDICINE CLINIC | Age: 52
End: 2022-07-29
Payer: COMMERCIAL

## 2022-07-29 DIAGNOSIS — F41.1 GENERALIZED ANXIETY DISORDER: ICD-10-CM

## 2022-07-29 DIAGNOSIS — F51.04 PSYCHOPHYSIOLOGICAL INSOMNIA: ICD-10-CM

## 2022-07-29 DIAGNOSIS — F33.1 MODERATE EPISODE OF RECURRENT MAJOR DEPRESSIVE DISORDER (HCC): Primary | ICD-10-CM

## 2022-07-29 PROCEDURE — 99214 OFFICE O/P EST MOD 30 MIN: CPT | Performed by: STUDENT IN AN ORGANIZED HEALTH CARE EDUCATION/TRAINING PROGRAM

## 2022-07-29 RX ORDER — VENLAFAXINE HYDROCHLORIDE 150 MG/1
150 CAPSULE, EXTENDED RELEASE ORAL DAILY
Qty: 30 CAPSULE | Refills: 1 | Status: SHIPPED | OUTPATIENT
Start: 2022-07-29 | End: 2022-09-22

## 2022-07-29 RX ORDER — OXYCODONE AND ACETAMINOPHEN 5; 325 MG/1; MG/1
TABLET ORAL
COMMUNITY
Start: 2022-07-17

## 2022-07-29 RX ORDER — VENLAFAXINE HYDROCHLORIDE 37.5 MG/1
37.5 CAPSULE, EXTENDED RELEASE ORAL DAILY
Qty: 30 CAPSULE | Refills: 1 | Status: SHIPPED | OUTPATIENT
Start: 2022-07-29 | End: 2022-09-22

## 2022-07-29 RX ORDER — TRAZODONE HYDROCHLORIDE 50 MG/1
TABLET ORAL
Qty: 90 TABLET | Refills: 1 | Status: SHIPPED | OUTPATIENT
Start: 2022-07-29 | End: 2022-09-22

## 2022-07-29 RX ORDER — BUPROPION HYDROCHLORIDE 300 MG/1
300 TABLET ORAL DAILY
Qty: 90 TABLET | Refills: 1 | Status: SHIPPED | OUTPATIENT
Start: 2022-07-29

## 2022-07-29 RX ORDER — PREGABALIN 150 MG/1
100 CAPSULE ORAL 2 TIMES DAILY
COMMUNITY

## 2022-07-29 NOTE — PATIENT INSTRUCTIONS
- Start taking Effexor 187.5 mg daily (take one 150 mg capsule and one 37.5 mg capsule daily)  - Follow up in about 2 weeks

## 2022-07-30 NOTE — PROGRESS NOTES
[address] Medicine Residency Attending Addendum:  Dr. Cynthia Hamilton MD,  the patient and I were not physically present during this encounter. The resident and I are concurrently monitoring the patient care through appropriate telecommunication technology. I discussed the findings, assessment and plan with the resident and agree with the resident's findings and plan as documented in the resident's note.       Nika Wallace MD

## 2022-09-21 NOTE — PROGRESS NOTES
Subjective   CC:  Siomara Veliz is a 45 y.o. female who presents for follow up of anxiety, depression, and insomnia. Anxiety / Depression / Insomnia:   - Current meds: Wellbutrin  mg daily, Effexor .5 mg daily (150 + 37.5 mg daily), Trazodone 50 mg qhs (has taken 100 mg Trazodone on occasion and it seems to work better than 50 mg)  - Last seen on 7/29 and Effexor dose was increased at that time   - She feels like the Wellbutrin and Effexor were really effective when they were first started but they seemed to stop working as well   - Prior meds tried: Zoloft (didn't work), melatonin  - Mood since last visit: has been feeling more anxious and depressed, no specific triggers   - Sleep: has trouble falling asleep and staying asleep, wakes up frequently, sleeps maybe 3-5 hours/night on average   - Interest decreased: yes  - Guilt and/or feelings of worthlessness: yes, low motivation   - Energy decreased: yes, feels tired/worn down  - Concentration problems: yes  - Appetite/weight changes: no  - Psychomotor agitation / retardation: no  - SI: none currently, as a child yes  - HI: no  - Auditory or visual hallucinations: no  - Excessive anxiety and/or worrying: yes  - History of psych admission: yes, as a child   - History of manic or hypomanic episode: no  - Substance use: none, used to drink heavily but quit drinking a couple years ago  - Used to run but not exercising currently    - Now interested in counseling/therapy     Review of Systems   Constitutional:  Positive for fatigue. Negative for activity change and appetite change. HENT:  Negative for congestion and rhinorrhea. Eyes:  Negative for visual disturbance. Respiratory:  Negative for shortness of breath. Cardiovascular:  Negative for chest pain. Gastrointestinal:  Negative for abdominal pain, nausea and vomiting. Genitourinary:  Negative for difficulty urinating. Musculoskeletal:  Positive for back pain (chronic).    Skin: Negative for rash. Neurological:  Negative for dizziness and headaches. Psychiatric/Behavioral:  Positive for decreased concentration, dysphoric mood and sleep disturbance. Negative for agitation, behavioral problems, hallucinations, self-injury and suicidal ideas. The patient is nervous/anxious. The patient is not hyperactive. Past Medical History  Past Medical History:   Diagnosis Date    Abnormal mammogram 08/05/2016    MMG birads 3/cysts. - 6 month follow up needed    Abnormal mammogram of left breast 02/08/2018; 2/20/18    Left breast retroareolar focal asymmetry; normal, rec 6 month f/u    Abnormal Pap smear     Chronic pain syndrome     Cervical radiculopathy, DDD, lumbar radiculopathy. Followed by [facility] and [address], [place]. Depressive disorder     H/O mammogram 10/28/2019    negative bi-rads 1 (dense)    History of bladder infections     History of mammogram 07/28/2015    Small neodensity right breast for which diagnostic mammogram recommended. [address]    PROM @ 29 wks-2#;? abruption;breech    Pap smear for cervical cancer screening 7/17/2010; 7/28/15; 6/11/18    normal; Negative; neg, hpv neg       Current Medications  Current Outpatient Medications   Medication Sig Dispense Refill    baclofen (LIORESAL) 10 mg tablet TAKE 1 TABLET BY MOUTH FOUR TIMES A DAY AS DIRECTED FOR 30 DAYS      pregabalin (LYRICA) 150 mg capsule Take 100 mg by mouth two (2) times a day. venlafaxine-SR (EFFEXOR-XR) 150 mg capsule Take 1 Capsule by mouth in the morning. Take 1 capsule (150 mg) daily along with Effexor XR 37.5 mg daily for a total daily dose of 187.5 mg daily. 30 Capsule 1    venlafaxine-SR (EFFEXOR-XR) 37.5 mg capsule Take 1 Capsule by mouth in the morning. Take 1 capsule (37.5 mg) daily along with Effexor  mg daily for a total daily dose of 187.5 mg daily.   Indications: anxiousness associated with depression 30 Capsule 1    buPROPion XL (WELLBUTRIN XL) 300 mg XL tablet Take 1 Tablet by mouth in the morning. Indications: anxiousness associated with depression 90 Tablet 1    traZODone (DESYREL) 50 mg tablet TAKE 1 TABLET BY MOUTH EVERY DAY NIGHTLY  Indications: insomnia associated with depression, major depressive disorder 90 Tablet 1    oxyCODONE-acetaminophen (PERCOCET) 5-325 mg per tablet TAKE 1 TABLET BY MOUTH THREE TIMES A DAY AS NEEDED FOR 30 DAYS      metoprolol tartrate (LOPRESSOR) 50 mg tablet TAKE 1/2 TABLET BY MOUTH TWICE DAILY  3    ketoconazole (NIZORAL) 2 % topical cream Apply  to affected area daily.  Apply to affect area daily for 14 days or until rash resolves (Patient not taking: Reported on 9/22/2022) 30 g 1       Allergies  No Known Allergies    Social History   Social History     Socioeconomic History    Marital status:      Spouse name: Not on file    Number of children: Not on file    Years of education: Not on file    Highest education level: Not on file   Occupational History    Not on file   Tobacco Use    Smoking status: Never    Smokeless tobacco: Never   Substance and Sexual Activity    Alcohol use: No    Drug use: No    Sexual activity: Yes     Partners: Male     Birth control/protection: Inserts   Other Topics Concern    Not on file   Social History Narrative    Not on file     Social Determinants of Health     Financial Resource Strain: Not on file   Food Insecurity: Not on file   Transportation Needs: Not on file   Physical Activity: Not on file   Stress: Not on file   Social Connections: Not on file   Intimate Partner Violence: Not on file   Housing Stability: Not on file       Family History  Family History   Problem Relation Age of Onset    No Known Problems Mother     No Known Problems Father        Objective   Vital Signs  Visit Vitals  BP (!) 92/54 (BP 1 Location: Right arm, BP Patient Position: Sitting, BP Cuff Size: Adult)   Pulse 73   Temp 98.2 °F (36.8 °C) (Oral)   Resp 18   Ht 5' 3\" (1.6 m)   Wt 146 lb (66.2 kg)   SpO2 95%   BMI 25.86 kg/m²       Physical Examination  Physical Exam  Vitals reviewed. Constitutional:       General: She is not in acute distress. Appearance: She is normal weight. She is not ill-appearing. Cardiovascular:      Rate and Rhythm: Normal rate and regular rhythm. Pulses: Normal pulses. Heart sounds: Normal heart sounds. No murmur heard. Pulmonary:      Effort: Pulmonary effort is normal.      Breath sounds: Normal breath sounds. Neurological:      Mental Status: She is alert and oriented to person, place, and time. Psychiatric:         Attention and Perception: Attention normal.         Mood and Affect: Mood normal.        Assessment and Plan   Radha Guy is a 45 y. o. who is here for follow up of anxiety, depression, and insomnia. She has not noticed any specific triggers for her anxiety or depression. She does deal with chronic back pain for which she is followed by pain management. No SI/HI. PHQ-9 score of 18 and ARYAN-7 score of 17 today. Patient is now open to counseling/therapy. Discussed how CBT can help with insomnia and mood. Provided with list of local counselors/therapists and encouraged her to schedule an appointment. Also discussed sleep huygenian and encouraged regular exercise. She has been taking Trazodone 100 mg on occasion with improvement in sleep so will increase dose to 100 mg qhs at this time. Will also increase Effexor to 225 mg daily (150 mg + 75 mg daily). If no improvement with this regimen will need to gradually wean Effexor and change medications. Follow up in 1-2 months for anxiety/depression/insomnia and in 6 months for annual physical.       ICD-10-CM ICD-9-CM    1. Recurrent moderate major depressive disorder with anxiety (HCC)  F33.1 296.32 venlafaxine-SR (EFFEXOR-XR) 150 mg capsule    F41.9 300.00 venlafaxine-SR (EFFEXOR-XR) 75 mg capsule      2.  Generalized anxiety disorder  F41.1 300.02 venlafaxine-SR (EFFEXOR-XR) 150 mg capsule      venlafaxine-SR (EFFEXOR-XR) 75 mg capsule      3. Insomnia secondary to depression with anxiety  F51.05 300.4 traZODone (DESYREL) 100 mg tablet    F41.8 327.02       4. Psychophysiological insomnia  F51.04 307.42 traZODone (DESYREL) 100 mg tablet           Patient is counseled to return to the office if symptoms do not improve as expected. Urgent consultation with the nearest Emergency Department is strongly recommended if condition worsens. Patient is counseled to follow up as recommended and to inform the office if any changes in treatment are recommended.       I discussed this patient with Dr. Marianne Reyes (Attending Physician)       Signed By:  Renard Landon DO  Family Medicine Resident

## 2022-09-22 ENCOUNTER — OFFICE VISIT (OUTPATIENT)
Dept: FAMILY MEDICINE CLINIC | Age: 52
End: 2022-09-22
Payer: COMMERCIAL

## 2022-09-22 VITALS
HEART RATE: 73 BPM | OXYGEN SATURATION: 95 % | TEMPERATURE: 98.2 F | WEIGHT: 146 LBS | HEIGHT: 63 IN | SYSTOLIC BLOOD PRESSURE: 92 MMHG | BODY MASS INDEX: 25.87 KG/M2 | RESPIRATION RATE: 18 BRPM | DIASTOLIC BLOOD PRESSURE: 54 MMHG

## 2022-09-22 DIAGNOSIS — F41.1 GENERALIZED ANXIETY DISORDER: ICD-10-CM

## 2022-09-22 DIAGNOSIS — F33.1 RECURRENT MODERATE MAJOR DEPRESSIVE DISORDER WITH ANXIETY (HCC): Primary | ICD-10-CM

## 2022-09-22 DIAGNOSIS — F41.8 INSOMNIA SECONDARY TO DEPRESSION WITH ANXIETY: ICD-10-CM

## 2022-09-22 DIAGNOSIS — F51.04 PSYCHOPHYSIOLOGICAL INSOMNIA: ICD-10-CM

## 2022-09-22 DIAGNOSIS — F51.05 INSOMNIA SECONDARY TO DEPRESSION WITH ANXIETY: ICD-10-CM

## 2022-09-22 DIAGNOSIS — F41.9 RECURRENT MODERATE MAJOR DEPRESSIVE DISORDER WITH ANXIETY (HCC): Primary | ICD-10-CM

## 2022-09-22 PROCEDURE — 99214 OFFICE O/P EST MOD 30 MIN: CPT | Performed by: STUDENT IN AN ORGANIZED HEALTH CARE EDUCATION/TRAINING PROGRAM

## 2022-09-22 RX ORDER — BACLOFEN 10 MG/1
TABLET ORAL
COMMUNITY
Start: 2022-09-01

## 2022-09-22 RX ORDER — VENLAFAXINE HYDROCHLORIDE 150 MG/1
150 CAPSULE, EXTENDED RELEASE ORAL DAILY
Qty: 90 CAPSULE | Refills: 1 | Status: SHIPPED | OUTPATIENT
Start: 2022-09-22

## 2022-09-22 RX ORDER — TRAZODONE HYDROCHLORIDE 100 MG/1
TABLET ORAL
Qty: 90 TABLET | Refills: 1 | Status: SHIPPED | OUTPATIENT
Start: 2022-09-22

## 2022-09-22 RX ORDER — VENLAFAXINE HYDROCHLORIDE 75 MG/1
75 CAPSULE, EXTENDED RELEASE ORAL DAILY
Qty: 90 CAPSULE | Refills: 1 | Status: SHIPPED | OUTPATIENT
Start: 2022-09-22

## 2022-09-22 NOTE — PROGRESS NOTES
Identified Patient with two Patient identifiers (Name and ). Two Patient Identifiers confirmed. Reviewed record in preparation for visit and have obtained necessary documentation. Chief Complaint   Patient presents with    Sleep Problem     Follow up       Visit Vitals  BP (!) 92/54 (BP 1 Location: Right arm, BP Patient Position: Sitting, BP Cuff Size: Adult)   Pulse 73   Temp 98.2 °F (36.8 °C) (Oral)   Resp 18   Ht 5' 3\" (1.6 m)   Wt 146 lb (66.2 kg)   SpO2 95%   BMI 25.86 kg/m²       1. Have you been to the ER, urgent care clinic since your last visit? Hospitalized since your last visit? No    2. Have you seen or consulted any other health care providers outside of the [address] [place] [address] since your last visit? Include any pap smears or colon screening.  No

## 2023-02-16 DIAGNOSIS — F33.1 MODERATE EPISODE OF RECURRENT MAJOR DEPRESSIVE DISORDER (HCC): ICD-10-CM

## 2023-02-16 RX ORDER — BUPROPION HYDROCHLORIDE 300 MG/1
300 TABLET ORAL DAILY
Qty: 90 TABLET | Refills: 1 | Status: SHIPPED | OUTPATIENT
Start: 2023-02-16

## 2023-02-21 PROBLEM — M54.17 LUMBOSACRAL RADICULOPATHY: Status: ACTIVE | Noted: 2020-04-29

## 2023-02-21 NOTE — PROGRESS NOTES
[address] Medicine Office Visit     Assessment/ Plan: Eduardo Watson is a 45 y.o. female presenting for:    Well-Woman Visit - Problem list reviewed and updated. Review of PMH, PSH, FH, social history, medications and allergies. Recent labs and imaging results reviewed. -- healthy lifestyle including diet and exercise reviewed  -- mental health screening: ***  -- dental home: ***  -- Pap smear: ***  -- mammogram: ***  -- colonoscopy: ***  -- immunizations: ***  -- Hep C screening: ***  -- DEXA: ***   -- lung cancer screening: ***    *** minutes were spent on the day of this encounter both with the patient and in related activities including chart review, care coordination and counseling. Patient instructions were discussed and/or provided in the AVS. The patient understands and agrees to the plan. RETURN TO CARE: ***  Future Appointments   Date Time Provider Bud Alcazar   2/22/2023  2:40 PM Favio Landa DO [facility]       Subjective:  No chief complaint on file. HPI: Erica Kincaid is a 45 y.o. female with PMH of anxiety, depression, insomnia, chronic pain here for [facility].      Hot Flashes, Hormone Issues   - has seen Dr. Violette Kehr in the past   - really bad, drenched, then freezing  - four or five times an hour every day   - has been like this for months   - stopped periods 2 years ago  - no cancers   - has been on Effexor for years  - history of irregular heart beat - heart tests have been okay   - pap and hormone labs ***    Rash on Foot  - cream did not help  - has been there for more than a year  - very itchy and hard to stop will bleed     Anxiety and Depression  - feels anxious, doesn't like to leave the house  - sad feeling, fakes it in front of people  - kids are fine,    - no longer working   for 30 years   - effexor worked great when started them, wellbutrin  - zoloft didn't work   - has tried to get into a psychiatrist but without luck   - has tried to get into therapy but left messages and nothing returned   - feels like a chemical imbalance, life overall pretty good   - referral psychiatrist ***    Left Arm  - fell and broke arm and pubic ramus while on vacation  - several years ago   - trouble lifting arm  - wore sling, no cast or surgery    HCM  - due for mammogram (last one 2019)   - colonoscopy   - labs last year okay - , alk phos 215     I have reviewed the patients problem list, current medications, allergies, family, medical and social history. I have updated them as needed. Review of Systems  See HPI. Objective: There were no vitals taken for this visit.   Physical Exam    [facility] reviewed the patients problem list, current medications, allergies, family, medical and social history. I have updated them as needed. Review of Systems  See HPI. Objective:  Visit Vitals  BP 97/61 (BP 1 Location: Right upper arm, BP Patient Position: Sitting)   Pulse 72   Temp 97.6 °F (36.4 °C) (Oral)   Resp 16   Ht 5' 3\" (1.6 m)   Wt 142 lb (64.4 kg)   SpO2 96%   BMI 25.15 kg/m²     Physical Exam  Vitals and nursing note reviewed. Constitutional:       General: She is not in acute distress. Appearance: Normal appearance. HENT:      Head: Normocephalic and atraumatic. Right Ear: External ear normal.      Left Ear: External ear normal.   Eyes:      Extraocular Movements: Extraocular movements intact. Conjunctiva/sclera: Conjunctivae normal.   Cardiovascular:      Rate and Rhythm: Normal rate. Pulmonary:      Effort: Pulmonary effort is normal. No respiratory distress. Neurological:      General: No focal deficit present. Mental Status: She is alert. Psychiatric:         Mood and Affect: Mood normal.         Behavior: Behavior normal.         Thought Content:  Thought content normal.         Judgment: Judgment normal.       [address] [place] [address]

## 2023-02-22 ENCOUNTER — OFFICE VISIT (OUTPATIENT)
Dept: FAMILY MEDICINE CLINIC | Age: 53
End: 2023-02-22
Payer: COMMERCIAL

## 2023-02-22 VITALS
WEIGHT: 142 LBS | SYSTOLIC BLOOD PRESSURE: 97 MMHG | RESPIRATION RATE: 16 BRPM | OXYGEN SATURATION: 96 % | BODY MASS INDEX: 25.16 KG/M2 | TEMPERATURE: 97.6 F | HEART RATE: 72 BPM | DIASTOLIC BLOOD PRESSURE: 61 MMHG | HEIGHT: 63 IN

## 2023-02-22 DIAGNOSIS — Z12.31 ENCOUNTER FOR SCREENING MAMMOGRAM FOR MALIGNANT NEOPLASM OF BREAST: ICD-10-CM

## 2023-02-22 DIAGNOSIS — F32.A ANXIETY AND DEPRESSION: Primary | ICD-10-CM

## 2023-02-22 DIAGNOSIS — N95.1 PERIMENOPAUSE: ICD-10-CM

## 2023-02-22 DIAGNOSIS — F40.10 SOCIAL ANXIETY DISORDER: ICD-10-CM

## 2023-02-22 DIAGNOSIS — F41.9 ANXIETY AND DEPRESSION: Primary | ICD-10-CM

## 2023-02-22 DIAGNOSIS — L24.9 IRRITANT DERMATITIS: ICD-10-CM

## 2023-02-22 PROCEDURE — 99214 OFFICE O/P EST MOD 30 MIN: CPT | Performed by: FAMILY MEDICINE

## 2023-02-22 RX ORDER — TRIAMCINOLONE ACETONIDE 5 MG/G
OINTMENT TOPICAL
Qty: 15 G | Refills: 0 | Status: SHIPPED | OUTPATIENT
Start: 2023-02-22

## 2023-02-22 NOTE — PROGRESS NOTES
Anna Hurtado is a 45 y.o. female    Chief Complaint   Patient presents with    Anxiety     Patient is coming in because she seems like her anxiety and depression has gotten worst.  She did  mention that her medication was increased but it did not. make a difference. She mentioned that the rash on her left foot is still there and the cream did not help it. She also mentioned having hot flashes No other concerns. 1. Have you been to the ER, urgent care clinic since your last visit? Hospitalized since your last visit? No    2. Have you seen or consulted any other health care providers outside of the [address] [place] [address] since your last visit? Include any pap smears or colon screening. No      Visit Vitals  BP 97/61 (BP 1 Location: Right upper arm, BP Patient Position: Sitting)   Pulse 72   Temp 97.6 °F (36.4 °C) (Oral)   Resp 16   Ht 5' 3\" (1.6 m)   Wt 142 lb (64.4 kg)   SpO2 96%   BMI 25.15 kg/m²           Health Maintenance Due   Topic Date Due    Colorectal Cancer Screening Combo  Never done    Breast Cancer Screen Mammogram  10/28/2021    Shingles Vaccine (2 of 2) 12/18/2021    COVID-19 Vaccine (4 - Booster for [employer] series) 10/25/2022         Medication Reconciliation completed, changes noted.   Please  Update medication list.

## 2023-02-22 NOTE — PATIENT INSTRUCTIONS
I will send you a message with information about the psychiatrist to see to discuss medication changes   Next visit we will plan on doing your pap smear and lab work

## 2023-02-22 NOTE — Clinical Note
Mich Quesada - I think you know this patient well. She is going to follow-up with you for [facility] with pap (due in June) and labs. She is also interested in HRT and I think would be a good candidate. Told her you'd probably want labs before initiating. I included the regimen I have started with in my note. Let me know if you have questions.  Thanks - Omnicakristina

## 2023-03-03 ENCOUNTER — OFFICE VISIT (OUTPATIENT)
Dept: FAMILY MEDICINE CLINIC | Age: 53
End: 2023-03-03

## 2023-03-03 VITALS
SYSTOLIC BLOOD PRESSURE: 92 MMHG | WEIGHT: 145 LBS | BODY MASS INDEX: 25.69 KG/M2 | DIASTOLIC BLOOD PRESSURE: 58 MMHG | TEMPERATURE: 98.3 F | OXYGEN SATURATION: 97 % | RESPIRATION RATE: 18 BRPM | HEIGHT: 63 IN | HEART RATE: 72 BPM

## 2023-03-03 DIAGNOSIS — M25.512 LEFT SHOULDER PAIN, UNSPECIFIED CHRONICITY: Primary | ICD-10-CM

## 2023-03-03 PROCEDURE — 99214 OFFICE O/P EST MOD 30 MIN: CPT | Performed by: FAMILY MEDICINE

## 2023-03-03 RX ORDER — PREGABALIN 200 MG/1
1 CAPSULE ORAL 2 TIMES DAILY
COMMUNITY
Start: 2023-02-20

## 2023-03-03 NOTE — PROGRESS NOTES
Identified pt with two pt identifiers(name and ). Reviewed record in preparation for visit and have obtained necessary documentation. Chief Complaint   Patient presents with    Arm Pain     Patient states she broke her arm 2 years ago and there are times where she can't lift arm up        Health Maintenance Due   Topic    Colorectal Cancer Screening Combo     Breast Cancer Screen Mammogram     Shingles Vaccine (2 of 2)    COVID-19 Vaccine (4 - Booster for [employer] series)       Visit Vitals  BP (!) 92/58 (BP 1 Location: Right upper arm, BP Patient Position: Sitting, BP Cuff Size: Adult)   Pulse 72   Temp 98.3 °F (36.8 °C) (Oral)   Resp 18   Ht 5' 3\" (1.6 m)   Wt 145 lb (65.8 kg)   SpO2 97%   BMI 25.69 kg/m²         Coordination of Care Questionnaire:  :   1) Have you been to an emergency room, urgent care, or hospitalized since your last visit? If yes, where when, and reason for visit? no       2. Have seen or consulted any other health care provider since your last visit? If yes, where when, and reason for visit? NO        Patient is accompanied by self I have received verbal consent from Codi Thomson to discuss any/all medical information while they are present in the room.

## 2023-03-03 NOTE — PROGRESS NOTES
[id number] [facility]      Chief Complaint:   Chief Complaint   Patient presents with    Arm Pain     Patient states she broke her arm 2 years ago and there are times where she can't lift arm up       History of Present Illness     Patient Identification  Kitty Romero is a 45 y.o. female complains of pain in the left shoulder pain. Started after breaking her humerus about 2 years ago - 10/2020. She fell down a flight of stairs and landed on her shoulder. She went to the ER and was told she had a shoulder fracture and was placed in a sling. She reports not being told to follow up with Ortho or anyone for the shoulder injury. She reports wearing the sling for a while and stopped. Chart review showed she had a virtual visit on 5/10/21 and she was referred to ortho but she never saw ortho. She has been followed by her pain management specialist.     Past Medical History:   Diagnosis Date    Abnormal mammogram 08/05/2016    MMG birads 3/cysts. - 6 month follow up needed    Abnormal mammogram of left breast 02/08/2018; 2/20/18    Left breast retroareolar focal asymmetry; normal, rec 6 month f/u    Abnormal Pap smear     Chronic pain syndrome     Cervical radiculopathy, DDD, lumbar radiculopathy. Followed by [facility] and Chip Becerril Dr, NP. Depressive disorder     H/O mammogram 10/28/2019    negative bi-rads 1 (dense)    History of bladder infections     History of mammogram 07/28/2015    Small neodensity right breast for which diagnostic mammogram recommended.     HX OTHER MEDICAL 1985    PROM @ 34 wks-2#;? abruption;breech    Pap smear for cervical cancer screening 7/17/2010; 7/28/15; 6/11/18    normal; Negative; neg, hpv neg     Family History   Problem Relation Age of Onset    No Known Problems Mother     No Known Problems Father      Current Outpatient Medications   Medication Sig Dispense Refill    pregabalin (LYRICA) 200 mg capsule Take 1 Capsule by mouth two (2) times a day. triamcinolone acetonide (KENALOG) 0.5 % ointment Apply twice a day as needed, use thin layer. Do not use more than 2 weeks. 15 g 0    buPROPion XL (WELLBUTRIN XL) 300 mg XL tablet TAKE 1 TABLET BY MOUTH IN THE MORNING. INDICATIONS: ANXIOUSNESS ASSOCIATED WITH DEPRESSION 90 Tablet 1    baclofen (LIORESAL) 10 mg tablet TAKE 1 TABLET BY MOUTH FOUR TIMES A DAY AS DIRECTED FOR 30 DAYS      venlafaxine-SR (EFFEXOR-XR) 150 mg capsule Take 1 Capsule by mouth daily. Take along with Effexor XR 75 mg for a total daily dose of 225 mg. Indications: anxiousness associated with depression 90 Capsule 1    venlafaxine-SR (EFFEXOR-XR) 75 mg capsule Take 1 Capsule by mouth daily. Take along with Effexor  mg for a total daily dose of 225 mg. 90 Capsule 1    traZODone (DESYREL) 100 mg tablet TAKE 1 TABLET BY MOUTH EVERY DAY NIGHTLY  Indications: insomnia associated with depression, major depressive disorder 90 Tablet 1    oxyCODONE-acetaminophen (PERCOCET) 5-325 mg per tablet TAKE 1 TABLET BY MOUTH THREE TIMES A DAY AS NEEDED FOR 30 DAYS      metoprolol tartrate (LOPRESSOR) 50 mg tablet TAKE 1/2 TABLET BY MOUTH TWICE DAILY  3    pregabalin (LYRICA) 150 mg capsule Take 100 mg by mouth two (2) times a day. (Patient not taking: Reported on 3/3/2023)       No Known Allergies    Review of Systems  {ROS - complete:53659}     Physical Exam     Visit Vitals  BP (!) 92/58 (BP 1 Location: Right upper arm, BP Patient Position: Sitting, BP Cuff Size: Adult)   Pulse 72   Temp 98.3 °F (36.8 °C) (Oral)   Resp 18   Ht 5' 3\" (1.6 m)   Wt 145 lb (65.8 kg)   SpO2 97%   BMI 25.69 kg/m²       GEN: Well appearing. No apparent distress. Responds to all questions appropriately. Lungs: No labored respirations. Talking in complete sentences without difficulty.   Shoulder: {Left/right:73181}  Deformity: None    ROM:  Forward Flexion: Active: 90     ER (0): Active: 35     IR (0): Active: Behind the body to the level L5  Abduction: Active: 90       Palpation:  AC tenderness: None  SC tenderness: None  Clavicle tenderness: None  Biceps tenderness: None    Strength (0-5/5):  Deltoid - Anterior: 5/5  Deltoid - Posterior: 5/5  Deltoid - Mid: 5/5  Supraspinatus: 5/5  External rotation: 5/5  Internal rotation: 5/5    Neuro/Vascular:  Pulses intact, no edema, and neurologically intact    C-Spine:  Cervical motion: FROM without pain. Cervical tenderness: None    Skin: No obvious rash or skin breakdown. Imaging: ***    Assessment:    ICD-10-CM ICD-9-CM    1. Left shoulder pain, unspecified chronicity  M25.512 719.41 XR SHOULDER LT AP/LAT MIN 2 V        REferred to Miesha Mora. Plan:  1. Home Exercise Program as per handout. 2. Ice 15 minutes, three times a day PRN and after exercise. Can alternate with heat for 15 minutes. Medications:    1. Naproxin (Aleve): 220mg 1-2 tablets twice a day PRN. 2. Acetaminophen (Tylenol):  500mg 1-2 tablets every 6 hours as needed for pain.     RTC: *** weeks

## 2023-03-21 NOTE — PROGRESS NOTES
ROUTINE ANNUAL WELL WOMAN    Subjective   Kristin Joy is a 45 y.o. female with hx of anxiety, depression, insomnia, chronic pain who presents to clinic today for annual physical exam.      She would also like to address the following issues:  No concerns today. Gyn History     No LMP recorded. (Menstrual status: Menopause). LMP ~4-5  years ago. Sexually active?: yes, with , rarely     Any family history of gyn cancers (breast, ovarian or cervical ca)? no  Any family history of colorectal cancer?  no    Diet: limited meat, oatmeal, cereal, yogurt, fruit for breakfast, light lunch like a sandwich and fruit, dinner - light eating; does tend to binge eat; drinks mostly water and diet Pepsi every other day (1-2 16 oz sodas)    Exercise: walking/running on treadmill for 20 minutes daily    Preventative care:  Depression screening: (hx of depression, has been referred to psych, on medication)  3 most recent PHQ Screens 3/22/2023   PHQ Not Done -   Little interest or pleasure in doing things More than half the days   Feeling down, depressed, irritable, or hopeless More than half the days   Total Score PHQ 2 4   Trouble falling or staying asleep, or sleeping too much Nearly every day   Feeling tired or having little energy Nearly every day   Poor appetite, weight loss, or overeating Nearly every day   Feeling bad about yourself - or that you are a failure or have let yourself or your family down More than half the days   Trouble concentrating on things such as school, work, reading, or watching TV More than half the days   Moving or speaking so slowly that other people could have noticed; or the opposite being so fidgety that others notice More than half the days   Thoughts of being better off dead, or hurting yourself in some way Not at all   PHQ 9 Score 19   How difficult have these problems made it for you to do your work, take care of your home and get along with others Somewhat difficult Hep C: negative March 2023   PAP Smear: last done June 2018 NIML/HPV negative, collecting today    HLD: collecting today   Diabetes screening: collecting today   Breast cancer screening: last done October 2019 - BI-RADS 1, repeat mammogram ordered  Colon cancer screening: never done, referral to GI for screening colonoscopy    Tdap: 12/7/2017  Shingles vaccine: has had 1 on 10/23/2021, recommended 2nd dose at [address] [facility] [place] vaccine: 5/5/2021, 11/6/2021, 8/30/2022, due for 2nd booster  Influenza vaccine: 8/30/2022      Past Medical History  Past Medical History:   Diagnosis Date    Abnormal mammogram 08/05/2016    MMG birads 3/cysts. - 6 month follow up needed    Abnormal mammogram of left breast 02/08/2018; 2/20/18    Left breast retroareolar focal asymmetry; normal, rec 6 month f/u    Abnormal Pap smear     Chronic pain syndrome     Cervical radiculopathy, DDD, lumbar radiculopathy. Followed by [facility] and Chip Becerril Dr, NP. Depressive disorder     H/O mammogram 10/28/2019    negative bi-rads 1 (dense)    History of bladder infections     History of mammogram 07/28/2015    Small neodensity right breast for which diagnostic mammogram recommended. [address]    PROM @ 29 wks-2#;? abruption;breech    Pap smear for cervical cancer screening 7/17/2010; 7/28/15; 6/11/18    normal; Negative; neg, hpv neg       Current Medications   Current Outpatient Medications on File Prior to Visit   Medication Sig Dispense Refill    pregabalin (LYRICA) 200 mg capsule Take 1 Capsule by mouth two (2) times a day. triamcinolone acetonide (KENALOG) 0.5 % ointment Apply twice a day as needed, use thin layer. Do not use more than 2 weeks. 15 g 0    buPROPion XL (WELLBUTRIN XL) 300 mg XL tablet TAKE 1 TABLET BY MOUTH IN THE MORNING.  INDICATIONS: ANXIOUSNESS ASSOCIATED WITH DEPRESSION 90 Tablet 1    baclofen (LIORESAL) 10 mg tablet TAKE 1 TABLET BY MOUTH FOUR TIMES A DAY AS DIRECTED FOR 30 DAYS      venlafaxine-SR (EFFEXOR-XR) 150 mg capsule Take 1 Capsule by mouth daily. Take along with Effexor XR 75 mg for a total daily dose of 225 mg. Indications: anxiousness associated with depression 90 Capsule 1    venlafaxine-SR (EFFEXOR-XR) 75 mg capsule Take 1 Capsule by mouth daily. Take along with Effexor  mg for a total daily dose of 225 mg. 90 Capsule 1    traZODone (DESYREL) 100 mg tablet TAKE 1 TABLET BY MOUTH EVERY DAY NIGHTLY  Indications: insomnia associated with depression, major depressive disorder 90 Tablet 1    oxyCODONE-acetaminophen (PERCOCET) 5-325 mg per tablet TAKE 1 TABLET BY MOUTH THREE TIMES A DAY AS NEEDED FOR 30 DAYS      metoprolol tartrate (LOPRESSOR) 50 mg tablet TAKE 1/2 TABLET BY MOUTH TWICE DAILY  3     No current facility-administered medications on file prior to visit.        Surgical History  Past Surgical History:   Procedure Laterality Date    HX  SECTION  1985    x3-1st low vert/ext to myometrium; 9427;6027    HX OTHER SURGICAL      to cx       Family History   Family History   Problem Relation Age of Onset    No Known Problems Mother     No Known Problems Father        Social History  Social History     Socioeconomic History    Marital status:      Spouse name: Not on file    Number of children: Not on file    Years of education: Not on file    Highest education level: Not on file   Occupational History    Not on file   Tobacco Use    Smoking status: Former     Packs/day: 1.00     Years: 10.00     Pack years: 10.00     Types: Cigarettes     Quit date:      Years since quittin.2    Smokeless tobacco: Never   Substance and Sexual Activity    Alcohol use: Not Currently    Drug use: Not Currently    Sexual activity: Yes     Partners: Male     Birth control/protection: Inserts   Other Topics Concern    Not on file   Social History Narrative    Not on file     Social Determinants of Health     Financial Resource Strain: Medium Risk Difficulty of Paying Living Expenses: Somewhat hard   Food Insecurity: Food Insecurity Present    Worried About Running Out of Food in the Last Year: Sometimes true    Ran Out of Food in the Last Year: Sometimes true   Transportation Needs: Not on file   Physical Activity: Not on file   Stress: Not on file   Social Connections: Not on file   Intimate Partner Violence: Not on file   Housing Stability: Not on file        Smoker? Former smoker, smoked 1 PPD for 10-12 years, quit at age 20   Alcohol Use? Not currently, used to drink 1 bottle of wine 3-4 days a week but stopped drinking several years ago    Drug Use? No    Occupation? Not currently employed     Objective   Vital Signs  Visit Vitals  BP (!) 92/57 (BP 1 Location: Right upper arm, BP Patient Position: Sitting, BP Cuff Size: Small adult)   Pulse 66   Temp 98.4 °F (36.9 °C) (Oral)   Resp 18   Ht 5' 3\" (1.6 m)   Wt 144 lb 9.6 oz (65.6 kg)   SpO2 98%   BMI 25.61 kg/m²       PHYSICAL EXAM   GEN: No apparent distress. Alert and oriented and responds to all questions appropriately. EYES:  Conjunctiva clear; pupils round and reactive to light; extraocular movements are intact. EAR: External ears are normal.  Tympanic membranes are clear and without effusion. NOSE: Turbinates are within normal limits. No drainage  OROPHYARYNX: No oral lesions or exudates. NECK:  Supple; no masses; thyroid normal           LUNGS: Respirations unlabored; clear to auscultation bilaterally  CARDIOVASCULAR: Regular, rate, and rhythm without murmurs, gallops or rubs   ABDOMEN: Soft; nontender; nondistended; normoactive bowel sounds; no masses or organomegaly  PELVIC: vaginal atrophy noted, normal cervix, uterus and adnexa, PAP: Pap smear done today, thin-prep method, exam chaperoned by Tasha Braun LPN  NEUROLOGIC: No focal neurologic deficits. Strength and sensation grossly intact. Coordination and gait grossly intact. EXT: Well perfused. No edema.   SKIN: No obvious miky    Assessment and Plan   Gideon Baltazar is a 45 y.o. female presenting to clinic today for routine annual exam.      ICD-10-CM ICD-9-CM    1. Well woman exam with routine gynecological exam  Z01.419 V72.31 HEMOGLOBIN A1C WITH EAG      LIPID PANEL      METABOLIC PANEL, COMPREHENSIVE      TSH 3RD GENERATION      TSH 3RD GENERATION      METABOLIC PANEL, COMPREHENSIVE      LIPID PANEL      HEMOGLOBIN A1C WITH EAG      2. Screening for malignant neoplasm of cervix  Z12.4 V76.2 PAP IG, APTIMA HPV AND RFX 16/18,45 ([id number])      PAP IG, APTIMA HPV AND RFX 16/18,45 ([id number])      3. Screening for malignant neoplasm of colon  Z12.11 V76.51 REFERRAL TO GASTROENTEROLOGY      4. Overweight (BMI 25.0-29. 9)  E66.3 278.02 TSH 3RD GENERATION      TSH 3RD GENERATION      5. Screening for diabetes mellitus  Z13.1 V77.1 HEMOGLOBIN A1C WITH EAG      METABOLIC PANEL, COMPREHENSIVE      METABOLIC PANEL, COMPREHENSIVE      HEMOGLOBIN A1C WITH EAG      6. Mixed hyperlipidemia  E78.2 272.2 LIPID PANEL      LIPID PANEL      7. Hot flashes  R23.2 782.62 TSH 3RD GENERATION      TSH 3RD GENERATION        - F/U Pap smear, labs  - Referral sent to GI for screening colonoscopy   - Screening mammogram ordered at last visit   - Recommended 2nd dose of Shingrix vaccine at [address] re: diet, exercise, healthy lifestyle  - Return for yearly wellness visits  - Hot flashes: Checking labs today. Follow up virtually or in-person to discuss starting HRT (0.5mg oral estradiol daily + 200mg micronized progesterone first 12 days of the month; titrate estradiol to symptoms). No known hx of breast cancer, CHD, VTE, liver disease, AUB, TIA, endometrial cancer. I discussed the aforementioned diagnoses with the patient as well as the plan of care. All questions were answered and an AVS was provided.      I discussed this patient with Dr. Arely Palacio (Attending Physician)      Signed By:  Denton Morillo DO  Family Medicine Resident

## 2023-03-22 ENCOUNTER — HOSPITAL ENCOUNTER (OUTPATIENT)
Facility: HOSPITAL | Age: 53
Setting detail: SPECIMEN
Discharge: HOME | End: 2023-03-25
Payer: COMMERCIAL

## 2023-03-22 ENCOUNTER — OFFICE VISIT (OUTPATIENT)
Dept: FAMILY MEDICINE CLINIC | Age: 53
End: 2023-03-22

## 2023-03-22 VITALS
HEIGHT: 63 IN | HEART RATE: 66 BPM | BODY MASS INDEX: 25.62 KG/M2 | SYSTOLIC BLOOD PRESSURE: 92 MMHG | DIASTOLIC BLOOD PRESSURE: 57 MMHG | OXYGEN SATURATION: 98 % | WEIGHT: 144.6 LBS | TEMPERATURE: 98.4 F | RESPIRATION RATE: 18 BRPM

## 2023-03-22 DIAGNOSIS — R23.2 HOT FLASHES: ICD-10-CM

## 2023-03-22 DIAGNOSIS — Z12.11 SCREENING FOR MALIGNANT NEOPLASM OF COLON: ICD-10-CM

## 2023-03-22 DIAGNOSIS — Z13.1 SCREENING FOR DIABETES MELLITUS: ICD-10-CM

## 2023-03-22 DIAGNOSIS — Z01.419 WELL WOMAN EXAM WITH ROUTINE GYNECOLOGICAL EXAM: Primary | ICD-10-CM

## 2023-03-22 DIAGNOSIS — E78.2 MIXED HYPERLIPIDEMIA: ICD-10-CM

## 2023-03-22 DIAGNOSIS — Z12.4 SCREENING FOR MALIGNANT NEOPLASM OF CERVIX: ICD-10-CM

## 2023-03-22 DIAGNOSIS — E66.3 OVERWEIGHT (BMI 25.0-29.9): ICD-10-CM

## 2023-03-22 PROCEDURE — 88175 CYTOPATH C/V AUTO FLUID REDO: CPT

## 2023-03-22 PROCEDURE — 87624 HPV HI-RISK TYP POOLED RSLT: CPT

## 2023-03-22 NOTE — PROGRESS NOTES
Identified pt with two pt identifiers(name and ). Reviewed record in preparation for visit and have obtained necessary documentation. Chief Complaint   Patient presents with    Well Woman        Health Maintenance Due   Topic    Colorectal Cancer Screening Combo     Breast Cancer Screen Mammogram     Shingles Vaccine (2 of 2)    COVID-19 Vaccine (4 - Booster for [employer] series)       Visit Vitals  BP (!) 92/57 (BP 1 Location: Right upper arm, BP Patient Position: Sitting, BP Cuff Size: Small adult)   Pulse 66   Temp 98.4 °F (36.9 °C) (Oral)   Resp 18   Ht 5' 3\" (1.6 m)   Wt 144 lb 9.6 oz (65.6 kg)   SpO2 98%   BMI 25.61 kg/m²         Coordination of Care Questionnaire:  :   1) Have you been to an emergency room, urgent care, or hospitalized since your last visit? If yes, where when, and reason for visit? no       2. Have seen or consulted any other health care provider since your last visit? If yes, where when, and reason for visit? NO        Patient is accompanied by self I have received verbal consent from Katherine Guevara to discuss any/all medical information while they are present in the room.

## 2023-03-22 NOTE — PATIENT INSTRUCTIONS
Call to schedule your mammogram and appointment with GI to set up a colonoscopy. Ask your pharmacist about getting the 2nd Shingrix vaccine.

## 2023-03-23 DIAGNOSIS — R74.8 ELEVATED ALKALINE PHOSPHATASE LEVEL: Primary | ICD-10-CM

## 2023-03-23 LAB
ALBUMIN SERPL-MCNC: 4.2 G/DL (ref 3.5–5)
ALBUMIN/GLOB SERPL: 1.2 (ref 1.1–2.2)
ALP SERPL-CCNC: 180 U/L (ref 45–117)
ALT SERPL-CCNC: 29 U/L (ref 12–78)
ANION GAP SERPL CALC-SCNC: 5 MMOL/L (ref 5–15)
AST SERPL-CCNC: 27 U/L (ref 15–37)
BILIRUB SERPL-MCNC: 0.3 MG/DL (ref 0.2–1)
BUN SERPL-MCNC: 11 MG/DL (ref 6–20)
BUN/CREAT SERPL: 12 (ref 12–20)
CALCIUM SERPL-MCNC: 9.5 MG/DL (ref 8.5–10.1)
CHLORIDE SERPL-SCNC: 100 MMOL/L (ref 97–108)
CHOLEST SERPL-MCNC: 208 MG/DL
CO2 SERPL-SCNC: 31 MMOL/L (ref 21–32)
CREAT SERPL-MCNC: 0.89 MG/DL (ref 0.55–1.02)
EST. AVERAGE GLUCOSE BLD GHB EST-MCNC: 120 MG/DL
GLOBULIN SER CALC-MCNC: 3.5 G/DL (ref 2–4)
GLUCOSE SERPL-MCNC: 80 MG/DL (ref 65–100)
HBA1C MFR BLD: 5.8 % (ref 4–5.6)
HDLC SERPL-MCNC: 49 MG/DL
HDLC SERPL: 4.2 (ref 0–5)
LDLC SERPL CALC-MCNC: 101 MG/DL (ref 0–100)
POTASSIUM SERPL-SCNC: 4.2 MMOL/L (ref 3.5–5.1)
PROT SERPL-MCNC: 7.7 G/DL (ref 6.4–8.2)
SODIUM SERPL-SCNC: 136 MMOL/L (ref 136–145)
TRIGL SERPL-MCNC: 290 MG/DL (ref ?–150)
TSH SERPL DL<=0.05 MIU/L-ACNC: 1.59 UIU/ML (ref 0.36–3.74)
VLDLC SERPL CALC-MCNC: 58 MG/DL

## 2023-03-27 LAB — GGT SERPL-CCNC: 22 U/L (ref 5–55)

## 2023-03-27 NOTE — PROGRESS NOTES
GGT wnl in the setting of elevated alk phos. Plan to check PTH, vitamin D level, and possible bone scan and/or referral to endocrinology for further evaluation.

## 2023-04-14 ENCOUNTER — HOSPITAL ENCOUNTER (OUTPATIENT)
Dept: MAMMOGRAPHY | Age: 53
Discharge: HOME | End: 2023-04-14
Attending: FAMILY MEDICINE
Payer: COMMERCIAL

## 2023-04-14 DIAGNOSIS — Z12.31 ENCOUNTER FOR SCREENING MAMMOGRAM FOR MALIGNANT NEOPLASM OF BREAST: ICD-10-CM

## 2023-04-14 PROCEDURE — 77067 SCR MAMMO BI INCL CAD: CPT

## 2023-04-27 DIAGNOSIS — F41.8 INSOMNIA SECONDARY TO DEPRESSION WITH ANXIETY: ICD-10-CM

## 2023-04-27 DIAGNOSIS — F51.04 PSYCHOPHYSIOLOGICAL INSOMNIA: ICD-10-CM

## 2023-04-27 DIAGNOSIS — F51.05 INSOMNIA SECONDARY TO DEPRESSION WITH ANXIETY: ICD-10-CM

## 2023-04-28 RX ORDER — TRAZODONE HYDROCHLORIDE 100 MG/1
TABLET ORAL
Qty: 30 TABLET | Refills: 5 | Status: SHIPPED | OUTPATIENT
Start: 2023-04-28

## 2023-08-23 ENCOUNTER — TELEPHONE (OUTPATIENT)
Age: 53
End: 2023-08-23

## 2023-08-23 DIAGNOSIS — F41.9 ANXIETY DISORDER, UNSPECIFIED TYPE: Primary | ICD-10-CM

## 2023-08-23 DIAGNOSIS — F41.9 ANXIETY DISORDER, UNSPECIFIED TYPE: ICD-10-CM

## 2023-08-23 RX ORDER — BUPROPION HYDROCHLORIDE 300 MG/1
300 TABLET ORAL DAILY
Qty: 30 TABLET | Refills: 1 | Status: SHIPPED | OUTPATIENT
Start: 2023-08-23 | End: 2023-08-26 | Stop reason: SDUPTHER

## 2023-08-23 NOTE — TELEPHONE ENCOUNTER
Patient called requesting a refill on Bupropion 300 mg. She stated that she will be out of the medication today, so she will need it to be sent to the pharmacy soon. Would like for the prescription to be sent to [facility] on [address]. Thanks!

## 2023-08-25 NOTE — TELEPHONE ENCOUNTER
Pt called to check status of this refill. This writer noticed that the Rx was not attached to a pharmacy. Please send Rx to [facility] at [address] [place] [address], [address] [place] [address], phone number is [phone].      Thank you

## 2023-08-26 DIAGNOSIS — F41.9 ANXIETY DISORDER, UNSPECIFIED TYPE: ICD-10-CM

## 2023-08-26 RX ORDER — BUPROPION HYDROCHLORIDE 300 MG/1
300 TABLET ORAL DAILY
Qty: 90 TABLET | Refills: 1 | Status: SHIPPED | OUTPATIENT
Start: 2023-08-26

## 2023-08-26 RX ORDER — BUPROPION HYDROCHLORIDE 300 MG/1
300 TABLET ORAL DAILY
Qty: 90 TABLET | Refills: 1 | Status: SHIPPED | OUTPATIENT
Start: 2023-10-26 | End: 2023-08-26

## 2023-09-05 DIAGNOSIS — F41.9 ANXIETY DISORDER, UNSPECIFIED TYPE: ICD-10-CM

## 2023-09-05 RX ORDER — BUPROPION HYDROCHLORIDE 300 MG/1
300 TABLET ORAL DAILY
Qty: 30 TABLET | Refills: 1 | Status: SHIPPED | OUTPATIENT
Start: 2023-09-05

## 2023-10-24 DIAGNOSIS — F41.9 ANXIETY DISORDER, UNSPECIFIED TYPE: ICD-10-CM

## 2023-10-24 NOTE — TELEPHONE ENCOUNTER
Pt called in requesting a refill for her Venlafaxine 75 mg and 150 mg and a refill for her bupropion. Pt's preferred pharmacy is [facility] at [address] [place] [address], phone number is [phone]. Pt also requested a call on whether or not this can be completed at [phone]. Thank you!

## 2023-10-29 RX ORDER — BUPROPION HYDROCHLORIDE 300 MG/1
300 TABLET ORAL DAILY
Qty: 90 TABLET | Refills: 0 | Status: SHIPPED | OUTPATIENT
Start: 2023-10-29

## 2023-10-29 RX ORDER — VENLAFAXINE HYDROCHLORIDE 150 MG/1
150 CAPSULE, EXTENDED RELEASE ORAL DAILY
Qty: 90 CAPSULE | Refills: 0 | Status: SHIPPED | OUTPATIENT
Start: 2023-10-29

## 2023-10-29 RX ORDER — VENLAFAXINE HYDROCHLORIDE 75 MG/1
75 CAPSULE, EXTENDED RELEASE ORAL DAILY
Qty: 90 CAPSULE | Refills: 0 | Status: SHIPPED | OUTPATIENT
Start: 2023-10-29

## 2024-02-13 RX ORDER — VENLAFAXINE HYDROCHLORIDE 75 MG/1
75 CAPSULE, EXTENDED RELEASE ORAL DAILY
Qty: 90 CAPSULE | Refills: 0 | Status: SHIPPED | OUTPATIENT
Start: 2024-02-13

## 2024-02-13 RX ORDER — VENLAFAXINE HYDROCHLORIDE 150 MG/1
150 CAPSULE, EXTENDED RELEASE ORAL DAILY
Qty: 90 CAPSULE | Refills: 0 | Status: SHIPPED | OUTPATIENT
Start: 2024-02-13

## 2024-02-13 NOTE — TELEPHONE ENCOUNTER
Medication Refill Request    Susan Trevino is requesting a refill of the following medication(s):   Requested Prescriptions     Pending Prescriptions Disp Refills    venlafaxine (EFFEXOR XR) 150 MG extended release capsule [Pharmacy Med Name: VENLAFAXINE HCL  MG CAP] 90 capsule 0     Sig: TAKE 1 CAPSULE BY MOUTH EVERY DAY    venlafaxine (EFFEXOR XR) 75 MG extended release capsule [Pharmacy Med Name: VENLAFAXINE HCL ER 75 MG CAP] 90 capsule 0     Sig: TAKE 1 CAPSULE BY MOUTH EVERY DAY        Listed PCP is Adair Bailey MD   Last provider to prescribe medication: Leo  Last Date of Medication Prescribed: 10/29/2023  Date of Last Office Visit at [facility]: 03/22/2023  FUTURE APPOINTMENT: No future appointments.    Please send refill to:    [facility]/pharmacy #[id number] - [place], [place] - [address] [facility] -  [phone] - F [phone] [address] [facility] [address]  Phone: [phone] Fax: [phone]      Please review request and approve or deny with recommendations.

## 2024-02-29 NOTE — PATIENT INSTRUCTIONS
Bradycardia: Care Instructions  Your Care Instructions    Bradycardia is a slow heart rate. If your heart beats too slowly, it can't supply your body with enough blood. This can make you weak or dizzy. Or it may make you pass out. Sometimes medicine can cause this problem. If this happens, your doctor may have you adjust one of your medicines. If a medicine is not the problem, your doctor may recommend a pacemaker. It is important to treat bradycardia so that you don't get more serious health problems. Your doctor will want to see you on a routine schedule to make sure that your heartbeat is normal.  Follow-up care is a key part of your treatment and safety. Be sure to make and go to all appointments, and call your doctor if you are having problems. It's also a good idea to know your test results and keep a list of the medicines you take. How can you care for yourself at home? · Take your medicines exactly as prescribed. Call your doctor if you think you are having a problem with your medicine. If your bradycardia is caused by another disease, your doctor will try to treat the disease. If it is caused by heart medicines, he or she will adjust your medicines. · Make lifestyle changes to improve your heart health. ? Get regular exercise. Try for 30 minutes on most days of the week. If you do not have other heart problems, you likely do not have limits on the type or level of activity that you can do. You may want to walk, swim, bike, or do other activities. Ask your doctor what level of exercise is safe for you. ? To control your cholesterol, avoid foods with a lot of fat, saturated fat, or sodium. Try to eat more fiber. And if your doctor says it's okay, get some exercise on most days. ? Do not smoke. Smoking can make your heart condition worse. If you need help quitting, talk to your doctor about stop-smoking programs and medicines. These can increase your chances of quitting for good. ?  Limit alcohol to 2 drinks a day for men and 1 drink a day for women. Too much alcohol can cause health problems. Pacemaker  If you have a pacemaker, you will get more specific information about it. Be sure to:  · Check your pulse as your doctor tells you. · Have your pacemaker checked as often as your doctor recommends. You may be able to do this over the phone or computer. · Avoid strong magnetic or electrical fields. These include MRIs, welding equipment, and generators. · You will be checked several times right after you get your pacemaker and when it is time to have the battery changed. Batteries last for 5 to 15 years. · You can talk on a cell phone. But keep it 6 inches away from your pacemaker. · Microwaves, TVs, radios, and kitchen and bathroom appliances won't harm you. When should you call for help? Call 911 anytime you think you may need emergency care. For example, call if:    · You have symptoms of sudden heart failure. These may include:  ? Severe trouble breathing. ? A fast or irregular heartbeat. ? Coughing up pink, foamy mucus. ? You passed out.     · You have symptoms of a stroke. These may include:  ? Sudden numbness, tingling, weakness, or loss of movement in your face, arm, or leg, especially on only one side of your body. ? Sudden vision changes. ? Sudden trouble speaking. ? Sudden confusion or trouble understanding simple statements. ? Sudden problems with walking or balance. ? A sudden, severe headache that is different from past headaches.    Call your doctor now or seek immediate medical care if:    · You have new or changed symptoms of heart failure, such as:  ? New or increased shortness of breath. ? New or worse swelling in your legs, ankles, or feet. ? Sudden weight gain, such as more than 2 to 3 pounds in a day or 5 pounds in a week. (Your doctor may suggest a different range of weight gain.)  ? Feeling dizzy or lightheaded or like you may faint. ?  Feeling so tired or weak that you cannot do your usual activities. ? Not sleeping well. Shortness of breath wakes you at night. You need extra pillows to prop yourself up to breathe easier.    Watch closely for changes in your health, and be sure to contact your doctor if:    · You do not get better as expected. Where can you learn more? Go to http://zohaib-chad.info/. Enter R308 in the search box to learn more about \"Bradycardia: Care Instructions. \"  Current as of: July 22, 2018  Content Version: 11.9  © 3244-8109 [employer]. Care instructions adapted under license by [employer] (which disclaims liability or warranty for this information). If you have questions about a medical condition or this instruction, always ask your healthcare professional. [address] any warranty or liability for your use of this information. Pulmonary embolism

## 2024-03-18 SDOH — ECONOMIC STABILITY: INCOME INSECURITY: HOW HARD IS IT FOR YOU TO PAY FOR THE VERY BASICS LIKE FOOD, HOUSING, MEDICAL CARE, AND HEATING?: VERY HARD

## 2024-03-18 SDOH — ECONOMIC STABILITY: HOUSING INSECURITY
IN THE LAST 12 MONTHS, WAS THERE A TIME WHEN YOU DID NOT HAVE A STEADY PLACE TO SLEEP OR SLEPT IN A SHELTER (INCLUDING NOW)?: NO

## 2024-03-18 SDOH — ECONOMIC STABILITY: FOOD INSECURITY: WITHIN THE PAST 12 MONTHS, THE FOOD YOU BOUGHT JUST DIDN'T LAST AND YOU DIDN'T HAVE MONEY TO GET MORE.: OFTEN TRUE

## 2024-03-18 SDOH — ECONOMIC STABILITY: FOOD INSECURITY: WITHIN THE PAST 12 MONTHS, YOU WORRIED THAT YOUR FOOD WOULD RUN OUT BEFORE YOU GOT MONEY TO BUY MORE.: OFTEN TRUE

## 2024-03-18 SDOH — ECONOMIC STABILITY: TRANSPORTATION INSECURITY
IN THE PAST 12 MONTHS, HAS LACK OF TRANSPORTATION KEPT YOU FROM MEETINGS, WORK, OR FROM GETTING THINGS NEEDED FOR DAILY LIVING?: YES

## 2024-03-20 ENCOUNTER — OFFICE VISIT (OUTPATIENT)
Age: 54
End: 2024-03-20
Payer: COMMERCIAL

## 2024-03-20 VITALS
SYSTOLIC BLOOD PRESSURE: 95 MMHG | DIASTOLIC BLOOD PRESSURE: 59 MMHG | OXYGEN SATURATION: 95 % | BODY MASS INDEX: 25.69 KG/M2 | RESPIRATION RATE: 18 BRPM | WEIGHT: 145 LBS | TEMPERATURE: 97.9 F | HEIGHT: 63 IN | HEART RATE: 66 BPM

## 2024-03-20 DIAGNOSIS — E87.5 HYPERKALEMIA: Primary | ICD-10-CM

## 2024-03-20 DIAGNOSIS — R73.03 PRE-DIABETES: ICD-10-CM

## 2024-03-20 DIAGNOSIS — Z85.828 HISTORY OF SKIN CANCER: ICD-10-CM

## 2024-03-20 DIAGNOSIS — R74.8 ELEVATED ALKALINE PHOSPHATASE LEVEL: ICD-10-CM

## 2024-03-20 DIAGNOSIS — R23.2 HOT FLASHES: ICD-10-CM

## 2024-03-20 PROCEDURE — 99214 OFFICE O/P EST MOD 30 MIN: CPT | Performed by: FAMILY MEDICINE

## 2024-03-20 ASSESSMENT — PATIENT HEALTH QUESTIONNAIRE - PHQ9
SUM OF ALL RESPONSES TO PHQ QUESTIONS 1-9: 17
1. LITTLE INTEREST OR PLEASURE IN DOING THINGS: SEVERAL DAYS
SUM OF ALL RESPONSES TO PHQ QUESTIONS 1-9: 17
SUM OF ALL RESPONSES TO PHQ QUESTIONS 1-9: 17
2. FEELING DOWN, DEPRESSED OR HOPELESS: MORE THAN HALF THE DAYS
5. POOR APPETITE OR OVEREATING: NEARLY EVERY DAY
SUM OF ALL RESPONSES TO PHQ QUESTIONS 1-9: 17
6. FEELING BAD ABOUT YOURSELF - OR THAT YOU ARE A FAILURE OR HAVE LET YOURSELF OR YOUR FAMILY DOWN: MORE THAN HALF THE DAYS
8. MOVING OR SPEAKING SO SLOWLY THAT OTHER PEOPLE COULD HAVE NOTICED. OR THE OPPOSITE, BEING SO FIGETY OR RESTLESS THAT YOU HAVE BEEN MOVING AROUND A LOT MORE THAN USUAL: SEVERAL DAYS
10. IF YOU CHECKED OFF ANY PROBLEMS, HOW DIFFICULT HAVE THESE PROBLEMS MADE IT FOR YOU TO DO YOUR WORK, TAKE CARE OF THINGS AT HOME, OR GET ALONG WITH OTHER PEOPLE: SOMEWHAT DIFFICULT
9. THOUGHTS THAT YOU WOULD BE BETTER OFF DEAD, OR OF HURTING YOURSELF: NOT AT ALL
7. TROUBLE CONCENTRATING ON THINGS, SUCH AS READING THE NEWSPAPER OR WATCHING TELEVISION: MORE THAN HALF THE DAYS
3. TROUBLE FALLING OR STAYING ASLEEP: NEARLY EVERY DAY
4. FEELING TIRED OR HAVING LITTLE ENERGY: NEARLY EVERY DAY
SUM OF ALL RESPONSES TO PHQ9 QUESTIONS 1 & 2: 3

## 2024-03-20 ASSESSMENT — ANXIETY QUESTIONNAIRES
5. BEING SO RESTLESS THAT IT IS HARD TO SIT STILL: MORE THAN HALF THE DAYS
6. BECOMING EASILY ANNOYED OR IRRITABLE: MORE THAN HALF THE DAYS
7. FEELING AFRAID AS IF SOMETHING AWFUL MIGHT HAPPEN: SEVERAL DAYS
IF YOU CHECKED OFF ANY PROBLEMS ON THIS QUESTIONNAIRE, HOW DIFFICULT HAVE THESE PROBLEMS MADE IT FOR YOU TO DO YOUR WORK, TAKE CARE OF THINGS AT HOME, OR GET ALONG WITH OTHER PEOPLE: SOMEWHAT DIFFICULT
2. NOT BEING ABLE TO STOP OR CONTROL WORRYING: MORE THAN HALF THE DAYS
GAD7 TOTAL SCORE: 14
1. FEELING NERVOUS, ANXIOUS, OR ON EDGE: NEARLY EVERY DAY
4. TROUBLE RELAXING: MORE THAN HALF THE DAYS
3. WORRYING TOO MUCH ABOUT DIFFERENT THINGS: MORE THAN HALF THE DAYS

## 2024-03-20 NOTE — PROGRESS NOTES
[facility] Office Visit     Assessment/ Plan: Susan Trevino is a 53 y.o. female presenting for:    Hyperkalemia  Palpitations - Seen by cardiology, had stress test that was reportably wnl. Had elevated K+, needs labs to recheck.     Elevated Alkaline Phosphatase - Last checked was 180, will repeat with GGT and fasting. If persistently elevated with normal GGT, would suggest non-hepatic etiology and would need further work-up. Calcium, TSH has previously been normal. Has not recently been >2x ULN.   -- consider PTH, additional thyroid levels, other causes of bony turnover - DEXA?, updated colon cancer screening     Prediabetes - Recheck A1C.     Hyperpigmentation - Mostly noticed on face, has history of skin cancer, prefers referral to dermatology for evaluation. Referral placed.     Hot Flashes - Persistent. On multiple medications that could be contributing - especially venlafaxine, bupropion as well as BB and chronic opioids (15mg oxycodone per day). Followed with GYN and had considered HRT but then missed visit. Post-menopausal for about 2 years. No contraindications, UTD on cancer screening. Reviewed risks and benefits with patient. The 10-year ASCVD risk score (Bangor DK, et al., 2019) is: 1.3%. Will start HRT with progesterone cycling for endometrial protection. Follow-up in 1 month.     Depression and Anxiety - Chronic PHQ9 (17), GAD7 (14). Managed by PCP, was referred to psychiatry but reports unable to get established there.     20 minutes were spent on the day of this encounter both with the patient and in related activities including chart review, care coordination and counseling.     Patient instructions were discussed and/or provided in the AVS. The patient understands and agrees to the plan.    RETURN TO CARE: following initiation of HRT prn   No future appointments.      Subjective:  Chief Complaint   Patient presents with    Discuss Labs     Patient is coming in to 
Susan Trevino is a 53 y.o. female      Chief Complaint   Patient presents with    Discuss Labs     Patient is coming in to discuss lab results from cardiology. Patient needs referral to dermatology because she think she has a few skin cancer spots.  Patient mentioned she was supposed to get a hormone replacement due to hot flashes she is having. No other concerns.        \"Have you been to the ER, urgent care clinic since your last visit?  Hospitalized since your last visit?\"    NO    “Have you seen or consulted any other health care providers outside of [facility] since your last visit?”    NO    “Have you had a colorectal cancer screening such as a colonoscopy/FIT/Cologuard?    NO       Vitals:    03/20/24 1534   BP: (!) 95/59   Site: Right Upper Arm   Position: Sitting   Pulse: 66   Resp: 18   Temp: 97.9 °F (36.6 °C)   TempSrc: Oral   SpO2: 95%   Weight: 65.8 kg (145 lb)   Height: 1.6 m (5' 3\")            Health Maintenance Due   Topic Date Due    Hepatitis B vaccine (1 of 3 - 3-dose series) Never done    HIV screen  Never done    Colorectal Cancer Screen  Never done    Shingles vaccine (2 of 2) 12/18/2021    Flu vaccine (1) 08/01/2023    COVID-19 Vaccine (4 - 2023-24 season) 09/01/2023    A1C test (Diabetic or Prediabetic)  03/22/2024    Depression Monitoring  03/22/2024         Medication Reconciliation completed, changes noted.  Please  Update medication list.    
You can access the FollowMyHealth Patient Portal offered by [facility] by registering at the following website: [email or web address]. By joining [employer]’s FollowMyHealth portal, you will also be able to view your health information using other applications (apps) compatible with our system.

## 2024-03-21 ENCOUNTER — NURSE ONLY (OUTPATIENT)
Age: 54
End: 2024-03-21

## 2024-03-21 DIAGNOSIS — R74.8 ELEVATED ALKALINE PHOSPHATASE LEVEL: ICD-10-CM

## 2024-03-21 DIAGNOSIS — R73.03 PRE-DIABETES: ICD-10-CM

## 2024-03-21 DIAGNOSIS — E87.5 HYPERKALEMIA: ICD-10-CM

## 2024-03-22 LAB
ALBUMIN SERPL-MCNC: 3.9 G/DL (ref 3.5–5)
ALBUMIN/GLOB SERPL: 1.2 (ref 1.1–2.2)
ALP SERPL-CCNC: 168 U/L (ref 45–117)
ALT SERPL-CCNC: 27 U/L (ref 12–78)
ANION GAP SERPL CALC-SCNC: 5 MMOL/L (ref 5–15)
AST SERPL-CCNC: 24 U/L (ref 15–37)
BILIRUB SERPL-MCNC: 0.3 MG/DL (ref 0.2–1)
BUN SERPL-MCNC: 12 MG/DL (ref 6–20)
BUN/CREAT SERPL: 15 (ref 12–20)
CALCIUM SERPL-MCNC: 9.4 MG/DL (ref 8.5–10.1)
CHLORIDE SERPL-SCNC: 105 MMOL/L (ref 97–108)
CO2 SERPL-SCNC: 31 MMOL/L (ref 21–32)
CREAT SERPL-MCNC: 0.82 MG/DL (ref 0.55–1.02)
EST. AVERAGE GLUCOSE BLD GHB EST-MCNC: 111 MG/DL
GGT SERPL-CCNC: 23 U/L (ref 5–55)
GLOBULIN SER CALC-MCNC: 3.3 G/DL (ref 2–4)
GLUCOSE SERPL-MCNC: 99 MG/DL (ref 65–100)
HBA1C MFR BLD: 5.5 % (ref 4–5.6)
POTASSIUM SERPL-SCNC: 5.3 MMOL/L (ref 3.5–5.1)
PROT SERPL-MCNC: 7.2 G/DL (ref 6.4–8.2)
SODIUM SERPL-SCNC: 141 MMOL/L (ref 136–145)

## 2024-03-26 ENCOUNTER — TELEPHONE (OUTPATIENT)
Age: 54
End: 2024-03-26

## 2024-03-26 NOTE — TELEPHONE ENCOUNTER
----- Message from Eli Jl sent at 3/26/2024 10:55 AM EDT -----  Subject: Results Request    QUESTIONS  Results: Blood work; Ordered by: Litzy Justice   Date Performed: 2024-03-21  ---------------------------------------------------------------------------  --------------  CALL BACK INFO    [phone]; OK to leave message on voicemail  ---------------------------------------------------------------------------  --------------

## 2024-03-26 NOTE — TELEPHONE ENCOUNTER
----- Message from Eli Russell sent at 3/26/2024 10:54 AM EDT -----  Subject: Referral Request    Reason for referral request? Patient needs to get a referral to another   dermatologist because the first one that she was referred to does not   accept her insurance. Patient would like to be referred to [facility] Department   of Dermatology fax number is [phone]. Please fax the order to that   number.   Provider patient wants to be referred to(if known):     Provider Phone Number(if known):[phone]    Additional Information for Provider?   ---------------------------------------------------------------------------  --------------  CALL BACK INFO    [phone]; OK to leave message on voicemail  ---------------------------------------------------------------------------  --------------

## 2024-03-27 NOTE — TELEPHONE ENCOUNTER
I called and notified the patient that the referral has been sent to [facility] Dept of Dermatology.

## 2024-04-16 PROBLEM — R74.8 ELEVATED ALKALINE PHOSPHATASE LEVEL: Status: ACTIVE | Noted: 2024-04-16

## 2024-04-16 PROBLEM — Z85.828 HISTORY OF SKIN CANCER: Status: ACTIVE | Noted: 2024-04-16

## 2024-04-16 RX ORDER — PROGESTERONE 200 MG/1
CAPSULE ORAL
Qty: 36 CAPSULE | Refills: 0 | Status: SHIPPED | OUTPATIENT
Start: 2024-04-16

## 2024-04-16 RX ORDER — ESTRADIOL 0.05 MG/D
1 PATCH, EXTENDED RELEASE TRANSDERMAL
Qty: 8 PATCH | Refills: 2 | Status: SHIPPED | OUTPATIENT
Start: 2024-04-18

## 2024-04-29 ENCOUNTER — TELEPHONE (OUTPATIENT)
Age: 54
End: 2024-04-29

## 2024-04-29 NOTE — TELEPHONE ENCOUNTER
Yanni with [facility] stated that their office has received a referral for pt; however, their facility does not accept Medicaid or any of their MCO's. Pt will need a new referral.    Thank you

## 2024-05-07 ENCOUNTER — HOSPITAL ENCOUNTER (OUTPATIENT)
Facility: HOSPITAL | Age: 54
Discharge: HOME | End: 2024-05-10
Payer: COMMERCIAL

## 2024-05-07 DIAGNOSIS — M54.17 RADICULOPATHY, LUMBOSACRAL REGION: ICD-10-CM

## 2024-05-07 PROCEDURE — 72148 MRI LUMBAR SPINE W/O DYE: CPT

## 2024-05-15 ENCOUNTER — OFFICE VISIT (OUTPATIENT)
Age: 54
End: 2024-05-15
Payer: COMMERCIAL

## 2024-05-15 VITALS
DIASTOLIC BLOOD PRESSURE: 61 MMHG | WEIGHT: 147 LBS | HEART RATE: 69 BPM | SYSTOLIC BLOOD PRESSURE: 97 MMHG | RESPIRATION RATE: 18 BRPM | HEIGHT: 63 IN | TEMPERATURE: 97.9 F | OXYGEN SATURATION: 97 % | BODY MASS INDEX: 26.05 KG/M2

## 2024-05-15 DIAGNOSIS — R23.2 HOT FLASHES: ICD-10-CM

## 2024-05-15 DIAGNOSIS — F33.1 MODERATE EPISODE OF RECURRENT MAJOR DEPRESSIVE DISORDER (HCC): ICD-10-CM

## 2024-05-15 DIAGNOSIS — F41.9 ANXIETY: ICD-10-CM

## 2024-05-15 DIAGNOSIS — R74.8 ELEVATED ALKALINE PHOSPHATASE LEVEL: ICD-10-CM

## 2024-05-15 DIAGNOSIS — F41.9 ANXIETY DISORDER, UNSPECIFIED TYPE: ICD-10-CM

## 2024-05-15 DIAGNOSIS — I49.9 CARDIAC ARRHYTHMIA, UNSPECIFIED CARDIAC ARRHYTHMIA TYPE: ICD-10-CM

## 2024-05-15 DIAGNOSIS — Z12.11 COLON CANCER SCREENING: ICD-10-CM

## 2024-05-15 DIAGNOSIS — Z79.890 HORMONE REPLACEMENT THERAPY: Primary | ICD-10-CM

## 2024-05-15 PROCEDURE — 99214 OFFICE O/P EST MOD 30 MIN: CPT | Performed by: FAMILY MEDICINE

## 2024-05-15 ASSESSMENT — PATIENT HEALTH QUESTIONNAIRE - PHQ9
6. FEELING BAD ABOUT YOURSELF - OR THAT YOU ARE A FAILURE OR HAVE LET YOURSELF OR YOUR FAMILY DOWN: MORE THAN HALF THE DAYS
SUM OF ALL RESPONSES TO PHQ9 QUESTIONS 1 & 2: 4
8. MOVING OR SPEAKING SO SLOWLY THAT OTHER PEOPLE COULD HAVE NOTICED. OR THE OPPOSITE, BEING SO FIGETY OR RESTLESS THAT YOU HAVE BEEN MOVING AROUND A LOT MORE THAN USUAL: SEVERAL DAYS
9. THOUGHTS THAT YOU WOULD BE BETTER OFF DEAD, OR OF HURTING YOURSELF: NOT AT ALL
7. TROUBLE CONCENTRATING ON THINGS, SUCH AS READING THE NEWSPAPER OR WATCHING TELEVISION: SEVERAL DAYS
SUM OF ALL RESPONSES TO PHQ QUESTIONS 1-9: 3
1. LITTLE INTEREST OR PLEASURE IN DOING THINGS: MORE THAN HALF THE DAYS
SUM OF ALL RESPONSES TO PHQ QUESTIONS 1-9: 16
1. LITTLE INTEREST OR PLEASURE IN DOING THINGS: SEVERAL DAYS
4. FEELING TIRED OR HAVING LITTLE ENERGY: MORE THAN HALF THE DAYS
3. TROUBLE FALLING OR STAYING ASLEEP: NEARLY EVERY DAY
SUM OF ALL RESPONSES TO PHQ QUESTIONS 1-9: 16
SUM OF ALL RESPONSES TO PHQ QUESTIONS 1-9: 3
SUM OF ALL RESPONSES TO PHQ QUESTIONS 1-9: 3
2. FEELING DOWN, DEPRESSED OR HOPELESS: MORE THAN HALF THE DAYS
2. FEELING DOWN, DEPRESSED OR HOPELESS: MORE THAN HALF THE DAYS
SUM OF ALL RESPONSES TO PHQ QUESTIONS 1-9: 3
5. POOR APPETITE OR OVEREATING: NEARLY EVERY DAY
SUM OF ALL RESPONSES TO PHQ9 QUESTIONS 1 & 2: 3
10. IF YOU CHECKED OFF ANY PROBLEMS, HOW DIFFICULT HAVE THESE PROBLEMS MADE IT FOR YOU TO DO YOUR WORK, TAKE CARE OF THINGS AT HOME, OR GET ALONG WITH OTHER PEOPLE: NOT DIFFICULT AT ALL

## 2024-05-15 ASSESSMENT — ANXIETY QUESTIONNAIRES
7. FEELING AFRAID AS IF SOMETHING AWFUL MIGHT HAPPEN: SEVERAL DAYS
GAD7 TOTAL SCORE: 11
6. BECOMING EASILY ANNOYED OR IRRITABLE: MORE THAN HALF THE DAYS
1. FEELING NERVOUS, ANXIOUS, OR ON EDGE: MORE THAN HALF THE DAYS
5. BEING SO RESTLESS THAT IT IS HARD TO SIT STILL: MORE THAN HALF THE DAYS
3. WORRYING TOO MUCH ABOUT DIFFERENT THINGS: SEVERAL DAYS
4. TROUBLE RELAXING: MORE THAN HALF THE DAYS
IF YOU CHECKED OFF ANY PROBLEMS ON THIS QUESTIONNAIRE, HOW DIFFICULT HAVE THESE PROBLEMS MADE IT FOR YOU TO DO YOUR WORK, TAKE CARE OF THINGS AT HOME, OR GET ALONG WITH OTHER PEOPLE: SOMEWHAT DIFFICULT
2. NOT BEING ABLE TO STOP OR CONTROL WORRYING: SEVERAL DAYS

## 2024-05-15 NOTE — PROGRESS NOTES
[facility] Office Visit     Assessment/ Plan: Susan Trevino is a 53 y.o. female presenting for:    Hot Flashes - Improved with initiation of HRT, no side effects. Will continue with current dose and follow closely to determine any need for dose adjustment.     Elevated Alkaline Phosphatase - Chronic, normal GGT pointing to non-hepatic etiology. Does have history of chronic pain, variety of locations. History of orthopedic surgery, fractues. Advised repeat labs then consideration of DEXA/bone scan.     Depression and Anxiety - Reports good control. Has struggled with finding psychiatric provider but okay with current medications.   -- venlafaxine 225mg (max dose) and buproprion 300mg XL daily - refills provided     Colon Cancer Screening - Advised cologuard.    20 minutes were spent on the day of this encounter both with the patient and in related activities including chart review, care coordination and counseling.     Patient instructions were discussed and/or provided in the AVS. The patient understands and agrees to the plan.    RETURN TO CARE: 3 months   No future appointments.      Subjective:  Chief Complaint   Patient presents with    Results     Patient is coming in to go over labs and to see how she is doing after being started on hormone replacement therapy. No other concerns.      HPI:   Susan Trevino is a 53 y.o. female with PMH of depression, anxiety, chronic pain, hot flashes here for follow-up HRT.     Mood, Hot Flashes  - has had trouble establishing with psych, PCP follows   - not spotting or bleeding   - last period around age 49     Derm  - Medicaid [employer] better health - [facility] not accepting medicaid, [facility] no new patients but then able to schedule  - some cryo , spot removed not cancerous     Labs, Spinal Stenosis, Multiple Broken bones - pubic ramus   - alk phos mildly elevated, GGT okay  - chronic pain, sees ortho   - no frequent steroids   - Farzad

## 2024-05-15 NOTE — PROGRESS NOTES
Susan Trevino is a 53 y.o. female      Chief Complaint   Patient presents with    Results     Patient is coming in to go over labs and to see how she is doing after being started on hormone replacement therapy. No other concerns.        \"Have you been to the ER, urgent care clinic since your last visit?  Hospitalized since your last visit?\"    NO    “Have you seen or consulted any other health care providers outside of [facility] since your last visit?”    NO        Vitals:    05/15/24 1602   BP: 97/61   Site: Right Upper Arm   Position: Sitting   Pulse: 69   Resp: 18   Temp: 97.9 °F (36.6 °C)   TempSrc: Oral   SpO2: 97%   Weight: 66.7 kg (147 lb)   Height: 1.6 m (5' 3\")            Health Maintenance Due   Topic Date Due    Hepatitis B vaccine (1 of 3 - 3-dose series) Never done    HIV screen  Never done    Colorectal Cancer Screen  Never done    Shingles vaccine (2 of 2) 12/18/2021    COVID-19 Vaccine (5 - 2023-24 season) 09/01/2023         Medication Reconciliation completed, changes noted.  Please  Update medication list.

## 2024-05-16 LAB
25(OH)D3 SERPL-MCNC: 26.9 NG/ML (ref 30–100)
ALBUMIN SERPL-MCNC: 3.6 G/DL (ref 3.5–5)
ALBUMIN/GLOB SERPL: 1.2 (ref 1.1–2.2)
ALP SERPL-CCNC: 155 U/L (ref 45–117)
ALT SERPL-CCNC: 23 U/L (ref 12–78)
ANION GAP SERPL CALC-SCNC: 6 MMOL/L (ref 5–15)
AST SERPL-CCNC: 26 U/L (ref 15–37)
BILIRUB SERPL-MCNC: 0.3 MG/DL (ref 0.2–1)
BUN SERPL-MCNC: 12 MG/DL (ref 6–20)
BUN/CREAT SERPL: 13 (ref 12–20)
CALCIUM SERPL-MCNC: 8.8 MG/DL (ref 8.5–10.1)
CALCIUM SERPL-MCNC: 9.1 MG/DL (ref 8.5–10.1)
CHLORIDE SERPL-SCNC: 106 MMOL/L (ref 97–108)
CO2 SERPL-SCNC: 28 MMOL/L (ref 21–32)
CREAT SERPL-MCNC: 0.92 MG/DL (ref 0.55–1.02)
GLOBULIN SER CALC-MCNC: 3 G/DL (ref 2–4)
GLUCOSE SERPL-MCNC: 99 MG/DL (ref 65–100)
PHOSPHATE SERPL-MCNC: 4.1 MG/DL (ref 2.6–4.7)
POTASSIUM SERPL-SCNC: 4.3 MMOL/L (ref 3.5–5.1)
PROT SERPL-MCNC: 6.6 G/DL (ref 6.4–8.2)
PTH-INTACT SERPL-MCNC: 85.2 PG/ML (ref 18.4–88)
SODIUM SERPL-SCNC: 140 MMOL/L (ref 136–145)
T4 FREE SERPL-MCNC: 0.8 NG/DL (ref 0.8–1.5)
TSH SERPL DL<=0.05 MIU/L-ACNC: 1.66 UIU/ML (ref 0.36–3.74)

## 2024-05-17 PROBLEM — Z79.890 HORMONE REPLACEMENT THERAPY: Status: ACTIVE | Noted: 2024-05-17

## 2024-05-17 RX ORDER — VENLAFAXINE HYDROCHLORIDE 75 MG/1
75 CAPSULE, EXTENDED RELEASE ORAL DAILY
Qty: 90 CAPSULE | Refills: 1 | Status: SHIPPED | OUTPATIENT
Start: 2024-05-17

## 2024-05-17 RX ORDER — BUPROPION HYDROCHLORIDE 300 MG/1
300 TABLET ORAL DAILY
Qty: 90 TABLET | Refills: 1 | Status: SHIPPED | OUTPATIENT
Start: 2024-05-17

## 2024-05-17 RX ORDER — PROGESTERONE 200 MG/1
CAPSULE ORAL
Qty: 36 CAPSULE | Refills: 0 | Status: SHIPPED | OUTPATIENT
Start: 2024-05-17

## 2024-05-17 RX ORDER — ESTRADIOL 0.05 MG/D
1 PATCH, EXTENDED RELEASE TRANSDERMAL
Qty: 8 PATCH | Refills: 2 | Status: SHIPPED | OUTPATIENT
Start: 2024-05-20

## 2024-05-17 RX ORDER — VENLAFAXINE HYDROCHLORIDE 150 MG/1
150 CAPSULE, EXTENDED RELEASE ORAL DAILY
Qty: 90 CAPSULE | Refills: 1 | Status: SHIPPED | OUTPATIENT
Start: 2024-05-17

## 2024-05-18 LAB — CA-I SERPL ISE-MCNC: 4.8 MG/DL (ref 4.5–5.6)

## 2024-06-06 LAB — NONINV COLON CA DNA+OCC BLD SCRN STL QL: NEGATIVE

## 2024-06-27 ENCOUNTER — PATIENT MESSAGE (OUTPATIENT)
Age: 54
End: 2024-06-27

## 2024-06-27 DIAGNOSIS — G89.29 CHRONIC SHOULDER PAIN, UNSPECIFIED LATERALITY: Primary | ICD-10-CM

## 2024-06-27 DIAGNOSIS — M25.519 CHRONIC SHOULDER PAIN, UNSPECIFIED LATERALITY: Primary | ICD-10-CM

## 2024-08-10 DIAGNOSIS — R23.2 HOT FLASHES: ICD-10-CM

## 2024-08-12 RX ORDER — PROGESTERONE 200 MG/1
CAPSULE ORAL
Qty: 36 CAPSULE | Refills: 1 | Status: SHIPPED | OUTPATIENT
Start: 2024-08-12

## 2024-08-12 NOTE — TELEPHONE ENCOUNTER
Medication Refill Request    Susan Trevino is requesting a refill of the following medication(s):   Requested Prescriptions     Pending Prescriptions Disp Refills    progesterone (PROMETRIUM) 200 MG CAPS capsule [Pharmacy Med Name: PROGESTERONE 200 MG CAPSULE] 36 capsule 1     Sig: TAKE 1 CAPSULE DAILY FOR FIRST 12 DAYS OF THE MONTH.        Listed PCP is Litzy Justice DO   Last provider to prescribe medication: Litzy Justice DO   Last Date of Medication Prescribed: 5/17/24   Date of Last Office Visit at [facility]: 5/15/24   FUTURE APPOINTMENT: No future appointments.    Please send refill to:    [facility]/pharmacy #[id number] [place], [place] - [address] [facility] -  [phone] - F [phone]  [address] [place] [address]  Phone: [phone] Fax: [phone]      Please review request and approve or deny with recommendations.

## 2024-08-14 DIAGNOSIS — R23.2 HOT FLASHES: ICD-10-CM

## 2024-08-14 DIAGNOSIS — F41.9 ANXIETY DISORDER, UNSPECIFIED TYPE: ICD-10-CM

## 2024-08-14 DIAGNOSIS — F33.1 MODERATE EPISODE OF RECURRENT MAJOR DEPRESSIVE DISORDER (HCC): ICD-10-CM

## 2024-08-14 NOTE — TELEPHONE ENCOUNTER
Medication Refill Request    Susan Trevino is requesting a refill of the following medication(s):   Requested Prescriptions     Pending Prescriptions Disp Refills    progesterone (PROMETRIUM) 200 MG CAPS capsule [Pharmacy Med Name: PROGESTERONE CAP] 36 capsule 0     Sig: Take 1 capsule the first 12 days of the month    venlafaxine (EFFEXOR XR) 150 MG extended release capsule [Pharmacy Med Name: VENLAFAXINE ER TAB] 90 capsule 0     Sig: Take 1 capsule daily    venlafaxine (EFFEXOR XR) 75 MG extended release capsule [Pharmacy Med Name: VENLAFAXINE ER TAB] 90 capsule 0     Sig: Tale 1 capsule daily        Listed PCP is Litzy Justice,    Last provider to prescribe medication: Vinh  Last Date of Medication Prescribed: 05/17/2024  Date of Last Office Visit at [facility]: 05/15/2024  FUTURE APPOINTMENT: No future appointments.    Please send refill to:    [facility] - [facility] [address] [phone] - F [phone]  [address] [place] [id number]  Phone: [phone] Fax: [phone]      Please review request and approve or deny with recommendations.

## 2024-08-16 RX ORDER — VENLAFAXINE HYDROCHLORIDE 75 MG/1
CAPSULE, EXTENDED RELEASE ORAL
Qty: 90 CAPSULE | Refills: 0 | Status: SHIPPED | OUTPATIENT
Start: 2024-08-16

## 2024-08-16 RX ORDER — VENLAFAXINE HYDROCHLORIDE 150 MG/1
CAPSULE, EXTENDED RELEASE ORAL
Qty: 90 CAPSULE | Refills: 1 | Status: SHIPPED | OUTPATIENT
Start: 2024-08-16

## 2024-08-16 RX ORDER — PROGESTERONE 200 MG/1
CAPSULE ORAL
Qty: 36 CAPSULE | Refills: 0 | OUTPATIENT
Start: 2024-08-16

## 2024-09-20 DIAGNOSIS — R23.2 HOT FLASHES: ICD-10-CM

## 2024-09-20 NOTE — TELEPHONE ENCOUNTER
Medication Refill Request    Susan Trevino is requesting a refill of the following medication(s):   Requested Prescriptions     Pending Prescriptions Disp Refills    estradiol (VIVELLE) 0.05 MG/24HR [Pharmacy Med Name: ESTRADIOL 0.05 MG PATCH (2/WK)] 8 patch 2     Sig: APPLY 1 PATCH TWICE A WEEK        Listed PCP is Litzy Justice, DO   Last provider to prescribe medication: Dr. Justice  Date of Last Office Visit at [facility]: 05/15/24     FUTURE APPOINTMENT: No future appointments.    Please send refill to:    [facility]/pharmacy #[id number] [place], [place] - [address] [facility] -  [phone] - F [phone] [address] [facility] [address]  Phone: [phone] Fax: [phone]    Please review request and approve or deny with recommendations within 48 hours.

## 2024-09-30 RX ORDER — ESTRADIOL 0.05 MG/D
PATCH, EXTENDED RELEASE TRANSDERMAL
Qty: 8 PATCH | Refills: 2 | Status: SHIPPED | OUTPATIENT
Start: 2024-09-30 | End: 2024-10-04

## 2024-10-01 ENCOUNTER — TELEPHONE (OUTPATIENT)
Age: 54
End: 2024-10-01

## 2024-10-01 NOTE — TELEPHONE ENCOUNTER
Maddy who is a pharmacist with [facility] called wanting to know which dose of the Venlafaxine was the patient taking or was she taking both doses. Pharmacy can contacted at [phone] and the reference number is [id number].    Thanks!   5

## 2024-10-01 NOTE — TELEPHONE ENCOUNTER
I returned the call to the pharmacy and clarified that the patient is on both dosages. They said they will go ahead and fill it and ship it out for the patient.

## 2024-10-01 NOTE — TELEPHONE ENCOUNTER
Patient is taking both doses - for total of venlafaxine 225mg daily (max dose). She has been stable on those dose for awhile. Would you please call pharmacist back to confirm. Thanks!    Litzy Justice,   10/1/2024 11:18 AM

## 2024-10-02 ENCOUNTER — TELEPHONE (OUTPATIENT)
Age: 54
End: 2024-10-02

## 2024-10-02 NOTE — TELEPHONE ENCOUNTER
Patient called and stated that the pharmacy stated that her insurance will not cover estradiol (VIVELLE) 0.05 MG/24HR. I reached out to the pharmacy to see what will be covered and this is the list of alternates:      Estring   Femring  Premarin-Vaginal cream  Estrace    Please send to:    Pharmacy    [facility]pharmacy #[id number] - [place], [place] - [address] [facility] - P [phone] - F [phone] [address] [facility] [address]  Phone: [phone]  Fax: [phone]

## 2024-10-04 DIAGNOSIS — R23.2 HOT FLASHES: ICD-10-CM

## 2024-10-04 RX ORDER — ESTRADIOL 0.05 MG/D
PATCH, EXTENDED RELEASE TRANSDERMAL
Qty: 8 PATCH | Refills: 2 | Status: CANCELLED | OUTPATIENT
Start: 2024-10-04

## 2024-10-04 RX ORDER — ESTERIFIED ESTROGENS 0.62 MG/1
0.62 TABLET, FILM COATED ORAL DAILY
Qty: 90 TABLET | Refills: 1 | Status: SHIPPED | OUTPATIENT
Start: 2024-10-04

## 2024-10-25 DIAGNOSIS — F33.1 MODERATE EPISODE OF RECURRENT MAJOR DEPRESSIVE DISORDER (HCC): ICD-10-CM

## 2024-10-25 DIAGNOSIS — F41.9 ANXIETY DISORDER, UNSPECIFIED TYPE: ICD-10-CM

## 2024-10-25 RX ORDER — VENLAFAXINE HYDROCHLORIDE 150 MG/1
150 CAPSULE, EXTENDED RELEASE ORAL DAILY
Qty: 90 CAPSULE | Refills: 1 | Status: SHIPPED | OUTPATIENT
Start: 2024-10-25

## 2024-10-25 RX ORDER — VENLAFAXINE HYDROCHLORIDE 75 MG/1
75 CAPSULE, EXTENDED RELEASE ORAL DAILY
Qty: 90 CAPSULE | Refills: 1 | Status: SHIPPED | OUTPATIENT
Start: 2024-10-25

## 2024-10-25 NOTE — TELEPHONE ENCOUNTER
Medication Refill Request    Susan Trevino is requesting a refill of the following medication(s):   Requested Prescriptions     Pending Prescriptions Disp Refills    venlafaxine (EFFEXOR XR) 150 MG extended release capsule [Pharmacy Med Name: VENLAFAXINE HCL  MG CAP] 90 capsule 1     Sig: TAKE 1 CAPSULE BY MOUTH DAILY APPOINTMENT REQUIRED BEFORE FURTHER REFILLS.    venlafaxine (EFFEXOR XR) 75 MG extended release capsule [Pharmacy Med Name: VENLAFAXINE HCL ER 75 MG CAP] 90 capsule 1     Sig: TAKE 1 CAPSULE BY MOUTH DAILY APPOINTMENT REQUIRED BEFORE FURTHER REFILLS.        Listed PCP is Litzy Justice, DO   Last provider to prescribe medication: Vinh  Last Date of Medication Prescribed: 08/16/2024  Date of Last Office Visit at [facility]: 05/15/2024  FUTURE APPOINTMENT: No future appointments.    Please send refill to:    [facility]/pharmacy #[id number] - [place], [place] - [address] [facility] -  [phone] - F [phone] [address] [place] [address]  Phone: [phone] Fax: [phone]    Please review request and approve or deny with recommendations.

## 2024-11-01 ENCOUNTER — OFFICE VISIT (OUTPATIENT)
Age: 54
End: 2024-11-01
Payer: MEDICARE

## 2024-11-01 VITALS
BODY MASS INDEX: 24.91 KG/M2 | WEIGHT: 140.6 LBS | SYSTOLIC BLOOD PRESSURE: 96 MMHG | OXYGEN SATURATION: 93 % | HEIGHT: 63 IN | TEMPERATURE: 98.4 F | HEART RATE: 72 BPM | RESPIRATION RATE: 16 BRPM | DIASTOLIC BLOOD PRESSURE: 61 MMHG

## 2024-11-01 DIAGNOSIS — R82.71 BACTERIURIA: ICD-10-CM

## 2024-11-01 DIAGNOSIS — Z87.440 HISTORY OF UTI: ICD-10-CM

## 2024-11-01 DIAGNOSIS — R10.2 SUPRAPUBIC PAIN: Primary | ICD-10-CM

## 2024-11-01 LAB
BILIRUBIN, URINE, POC: NEGATIVE
BLOOD URINE, POC: NEGATIVE
COMMENT:: NORMAL
GLUCOSE URINE, POC: NEGATIVE
KETONES, URINE, POC: NEGATIVE
LEUKOCYTE ESTERASE, URINE, POC: NEGATIVE
NITRITE, URINE, POC: NEGATIVE
PH, URINE, POC: 7 (ref 4.6–8)
PROTEIN,URINE, POC: NEGATIVE
SPECIFIC GRAVITY, URINE, POC: 1.02 (ref 1–1.03)
SPECIMEN HOLD: NORMAL
URINALYSIS CLARITY, POC: CLEAR
URINALYSIS COLOR, POC: YELLOW
UROBILINOGEN, POC: NORMAL

## 2024-11-01 PROCEDURE — G8484 FLU IMMUNIZE NO ADMIN: HCPCS | Performed by: FAMILY MEDICINE

## 2024-11-01 PROCEDURE — 3017F COLORECTAL CA SCREEN DOC REV: CPT | Performed by: FAMILY MEDICINE

## 2024-11-01 PROCEDURE — G8420 CALC BMI NORM PARAMETERS: HCPCS | Performed by: FAMILY MEDICINE

## 2024-11-01 PROCEDURE — 99213 OFFICE O/P EST LOW 20 MIN: CPT | Performed by: FAMILY MEDICINE

## 2024-11-01 PROCEDURE — 1036F TOBACCO NON-USER: CPT | Performed by: FAMILY MEDICINE

## 2024-11-01 PROCEDURE — G8427 DOCREV CUR MEDS BY ELIG CLIN: HCPCS | Performed by: FAMILY MEDICINE

## 2024-11-01 PROCEDURE — 81003 URINALYSIS AUTO W/O SCOPE: CPT | Performed by: FAMILY MEDICINE

## 2024-11-01 PROCEDURE — PBSHW AMB POC URINALYSIS DIP STICK AUTO W/O MICRO: Performed by: FAMILY MEDICINE

## 2024-11-01 ASSESSMENT — PATIENT HEALTH QUESTIONNAIRE - PHQ9
2. FEELING DOWN, DEPRESSED OR HOPELESS: MORE THAN HALF THE DAYS
6. FEELING BAD ABOUT YOURSELF - OR THAT YOU ARE A FAILURE OR HAVE LET YOURSELF OR YOUR FAMILY DOWN: NOT AT ALL
9. THOUGHTS THAT YOU WOULD BE BETTER OFF DEAD, OR OF HURTING YOURSELF: NOT AT ALL
3. TROUBLE FALLING OR STAYING ASLEEP: SEVERAL DAYS
1. LITTLE INTEREST OR PLEASURE IN DOING THINGS: SEVERAL DAYS
SUM OF ALL RESPONSES TO PHQ QUESTIONS 1-9: 8
8. MOVING OR SPEAKING SO SLOWLY THAT OTHER PEOPLE COULD HAVE NOTICED. OR THE OPPOSITE, BEING SO FIGETY OR RESTLESS THAT YOU HAVE BEEN MOVING AROUND A LOT MORE THAN USUAL: NOT AT ALL
SUM OF ALL RESPONSES TO PHQ QUESTIONS 1-9: 8
SUM OF ALL RESPONSES TO PHQ9 QUESTIONS 1 & 2: 3
SUM OF ALL RESPONSES TO PHQ QUESTIONS 1-9: 8
7. TROUBLE CONCENTRATING ON THINGS, SUCH AS READING THE NEWSPAPER OR WATCHING TELEVISION: SEVERAL DAYS
SUM OF ALL RESPONSES TO PHQ QUESTIONS 1-9: 8
10. IF YOU CHECKED OFF ANY PROBLEMS, HOW DIFFICULT HAVE THESE PROBLEMS MADE IT FOR YOU TO DO YOUR WORK, TAKE CARE OF THINGS AT HOME, OR GET ALONG WITH OTHER PEOPLE: NOT DIFFICULT AT ALL
4. FEELING TIRED OR HAVING LITTLE ENERGY: NEARLY EVERY DAY
5. POOR APPETITE OR OVEREATING: NOT AT ALL

## 2024-11-01 NOTE — PROGRESS NOTES
Susan Trevino is a 54 y.o. female      Chief Complaint   Patient presents with    Abdominal Pain     -lower  - pain has eased up   - not taking any OTC  - hurting for x1 week          \"Have you been to the ER, urgent care clinic since your last visit?  Hospitalized since your last visit?\"    YES - When: approximately 14 days ago.  Where and Why: [facility] - UTI.  Was placed on an antibiotic and has now finished - symtptoms have cleared up.    “Have you seen or consulted any other health care providers outside of [facility] since your last visit?”    NO              Vitals:    11/01/24 1018   BP: 96/61   Site: Right Upper Arm   Position: Sitting   Cuff Size: Small Adult   Pulse: 72   Resp: 16   Temp: 98.4 °F (36.9 °C)   TempSrc: Oral   SpO2: 93%   Weight: 63.8 kg (140 lb 9.6 oz)   Height: 1.6 m (5' 3\")            Health Maintenance Due   Topic Date Due    HIV screen  Never done    Hepatitis B vaccine (1 of 3 - 19+ 3-dose series) Never done    Shingles vaccine (2 of 2) 12/18/2021    Flu vaccine (1) 08/01/2024    COVID-19 Vaccine (5 - 2023-24 season) 09/01/2024    Annual Wellness Visit (Medicare)  Never done         Medication Reconciliation completed, changes noted.  Please  Update medication list.    [facility]   [address] [facility], [place] [address]  [phone]

## 2024-11-01 NOTE — PROGRESS NOTES
[facility] Office Visit     Assessment/ Plan: Susan Trevino is a 54 y.o. female presenting for:    Pelvic Pain - Acute onset, improving with treatment of UTI from urgent care. U/A today without hematuria, will send for culture to ensure UTI cleared. On exam some palpable scar tissue but no definitve hernia or groin LAD. Recommended stretches, heating pad. If no improvement in a week, will message and consider pelvic ultrasound.     20 minutes were spent on the day of this encounter both with the patient and in related activities including chart review, care coordination and counseling.     Patient instructions were discussed and/or provided in the AVS. The patient understands and agrees to the plan.    RETURN TO CARE: prn   No future appointments.    Subjective:  Chief Complaint   Patient presents with    Abdominal Pain     -lower  - pain has eased up   - not taking any OTC  - hurting for x1 week        HPI:   Susan Trevino is a 54 y.o. female with PMH of lumbosacral radiculopathy, depression, chronic pain, on HRT here for abdominal pain.     - recently prescribed cefuroxime, gets so many UTIs, still takes a week or two to feel better  feels pressure  - no discharge  - was feeling nauseaous, no vomiting  - no bowel mo  - RLQ - about a week, gradually getting better  - was constant, dull ache     Fell Off Steps  - MRI Pelvis - had fractures     I have reviewed the patients problem list, current medications, allergies, family, medical and social history. I have updated them as needed.     Review of Systems  See HPI.    Objective:  BP 96/61 (Site: Right Upper Arm, Position: Sitting, Cuff Size: Small Adult)   Pulse 72   Temp 98.4 °F (36.9 °C) (Oral)   Resp 16   Ht 1.6 m (5' 3\")   Wt 63.8 kg (140 lb 9.6 oz)   SpO2 93%   BMI 24.91 kg/m²   Physical Exam  Vitals and nursing note reviewed.   Constitutional:       General: She is not in acute distress.     Appearance: Normal

## 2024-11-02 LAB
BACTERIA SPEC CULT: NORMAL
SERVICE CMNT-IMP: NORMAL

## 2025-01-30 DIAGNOSIS — R23.2 HOT FLASHES: ICD-10-CM

## 2025-01-30 RX ORDER — PROGESTERONE 200 MG/1
CAPSULE ORAL
Qty: 36 CAPSULE | Refills: 1 | Status: SHIPPED | OUTPATIENT
Start: 2025-01-30

## 2025-01-30 NOTE — TELEPHONE ENCOUNTER
Medication Refill Request    Susan Trevino is requesting a refill of the following medication(s):   Requested Prescriptions     Pending Prescriptions Disp Refills    progesterone (PROMETRIUM) 200 MG CAPS capsule [Pharmacy Med Name: PROGESTERONE 200 MG CAPSULE] 36 capsule 1     Sig: TAKE 1 CAPSULE DAILY FOR FIRST 12 DAYS OF THE MONTH.        Listed PCP is Litzy Justice, DO   Last provider to prescribe medication: SACHIN  Last Date of Medication Prescribed: 08/12/2024  Date of Last Office Visit at [facility]: 11/01/2024  FUTURE APPOINTMENT: No future appointments.    Please send refill to:    [facility]/pharmacy #[id number] [place], [place] - [address] [facility] [address] -  [phone] - F [phone] [address] [facility] [address]  Phone: [phone] Fax: [phone]        Please review request and approve or deny with recommendations.

## 2025-02-05 DIAGNOSIS — F41.9 ANXIETY DISORDER, UNSPECIFIED TYPE: ICD-10-CM

## 2025-02-05 DIAGNOSIS — F33.1 MODERATE EPISODE OF RECURRENT MAJOR DEPRESSIVE DISORDER (HCC): ICD-10-CM

## 2025-02-09 RX ORDER — BUPROPION HYDROCHLORIDE 300 MG/1
300 TABLET ORAL DAILY
Qty: 90 TABLET | Refills: 1 | Status: SHIPPED | OUTPATIENT
Start: 2025-02-09

## 2025-02-10 DIAGNOSIS — R23.2 HOT FLASHES: ICD-10-CM

## 2025-02-10 RX ORDER — ESTRADIOL 0.05 MG/D
1 PATCH, EXTENDED RELEASE TRANSDERMAL
Qty: 8 PATCH | Refills: 3 | Status: CANCELLED | OUTPATIENT
Start: 2025-02-10

## 2025-02-10 RX ORDER — ESTERIFIED ESTROGENS 0.62 MG/1
0.62 TABLET, FILM COATED ORAL DAILY
Qty: 90 TABLET | Refills: 0 | Status: SHIPPED | OUTPATIENT
Start: 2025-02-10

## 2025-02-10 RX ORDER — ESTERIFIED ESTROGENS 0.62 MG/1
0.62 TABLET, FILM COATED ORAL DAILY
Qty: 90 TABLET | Refills: 0 | Status: SHIPPED | OUTPATIENT
Start: 2025-02-10 | End: 2025-02-10

## 2025-02-10 NOTE — TELEPHONE ENCOUNTER
Medication Refill Request    Susan Trevino is requesting a refill of the following medication(s):   Requested Prescriptions     Pending Prescriptions Disp Refills    esterified estrogens (MENEST) 0.625 MG TABS tablet 90 tablet 1     Sig: Take 1 tablet by mouth daily    estradiol (VIVELLE) 0.05 MG/24HR 8 patch 3     Sig: Place 1 patch onto the skin Twice a Week        Listed PCP is Litzy Justice, DO   Last provider to prescribe medication:nerissa  Last Date of Medication Prescribed: 09/2024,10/04/2024  Date of Last Office Visit at [facility]: 11/01/2024  FUTURE APPOINTMENT: No future appointments.    Please send refill to:    [facility] - [facility] [address] -  [phone] -  [phone]  [address] [place] [id number]  Phone: [phone] Fax: [phone]      Please review request and approve or deny with recommendations.

## 2025-04-22 SDOH — HEALTH STABILITY: PHYSICAL HEALTH: ON AVERAGE, HOW MANY DAYS PER WEEK DO YOU ENGAGE IN MODERATE TO STRENUOUS EXERCISE (LIKE A BRISK WALK)?: 2 DAYS

## 2025-04-22 SDOH — HEALTH STABILITY: PHYSICAL HEALTH: ON AVERAGE, HOW MANY MINUTES DO YOU ENGAGE IN EXERCISE AT THIS LEVEL?: 20 MIN

## 2025-04-22 ASSESSMENT — PATIENT HEALTH QUESTIONNAIRE - PHQ9
SUM OF ALL RESPONSES TO PHQ QUESTIONS 1-9: 13
SUM OF ALL RESPONSES TO PHQ QUESTIONS 1-9: 13
1. LITTLE INTEREST OR PLEASURE IN DOING THINGS: SEVERAL DAYS
9. THOUGHTS THAT YOU WOULD BE BETTER OFF DEAD, OR OF HURTING YOURSELF: NOT AT ALL
8. MOVING OR SPEAKING SO SLOWLY THAT OTHER PEOPLE COULD HAVE NOTICED. OR THE OPPOSITE, BEING SO FIGETY OR RESTLESS THAT YOU HAVE BEEN MOVING AROUND A LOT MORE THAN USUAL: SEVERAL DAYS
3. TROUBLE FALLING OR STAYING ASLEEP: MORE THAN HALF THE DAYS
7. TROUBLE CONCENTRATING ON THINGS, SUCH AS READING THE NEWSPAPER OR WATCHING TELEVISION: MORE THAN HALF THE DAYS
5. POOR APPETITE OR OVEREATING: MORE THAN HALF THE DAYS
SUM OF ALL RESPONSES TO PHQ QUESTIONS 1-9: 13
4. FEELING TIRED OR HAVING LITTLE ENERGY: MORE THAN HALF THE DAYS
6. FEELING BAD ABOUT YOURSELF - OR THAT YOU ARE A FAILURE OR HAVE LET YOURSELF OR YOUR FAMILY DOWN: SEVERAL DAYS
10. IF YOU CHECKED OFF ANY PROBLEMS, HOW DIFFICULT HAVE THESE PROBLEMS MADE IT FOR YOU TO DO YOUR WORK, TAKE CARE OF THINGS AT HOME, OR GET ALONG WITH OTHER PEOPLE: SOMEWHAT DIFFICULT
2. FEELING DOWN, DEPRESSED OR HOPELESS: MORE THAN HALF THE DAYS
SUM OF ALL RESPONSES TO PHQ QUESTIONS 1-9: 13

## 2025-04-22 ASSESSMENT — LIFESTYLE VARIABLES
HOW MANY STANDARD DRINKS CONTAINING ALCOHOL DO YOU HAVE ON A TYPICAL DAY: 0
HOW MANY STANDARD DRINKS CONTAINING ALCOHOL DO YOU HAVE ON A TYPICAL DAY: PATIENT DOES NOT DRINK
HOW OFTEN DO YOU HAVE A DRINK CONTAINING ALCOHOL: NEVER
HOW OFTEN DO YOU HAVE SIX OR MORE DRINKS ON ONE OCCASION: 1
HOW OFTEN DO YOU HAVE A DRINK CONTAINING ALCOHOL: 1

## 2025-04-23 ENCOUNTER — OFFICE VISIT (OUTPATIENT)
Age: 55
End: 2025-04-23
Payer: MEDICARE

## 2025-04-23 VITALS
HEART RATE: 61 BPM | DIASTOLIC BLOOD PRESSURE: 72 MMHG | BODY MASS INDEX: 25.34 KG/M2 | SYSTOLIC BLOOD PRESSURE: 115 MMHG | OXYGEN SATURATION: 95 % | RESPIRATION RATE: 18 BRPM | HEIGHT: 63 IN | TEMPERATURE: 98.2 F | WEIGHT: 143 LBS

## 2025-04-23 DIAGNOSIS — E55.9 VITAMIN D DEFICIENCY: ICD-10-CM

## 2025-04-23 DIAGNOSIS — J30.2 SEASONAL ALLERGIES: ICD-10-CM

## 2025-04-23 DIAGNOSIS — R78.71 ABNORMAL LEAD LEVEL IN BLOOD: ICD-10-CM

## 2025-04-23 DIAGNOSIS — R23.2 HOT FLASHES: ICD-10-CM

## 2025-04-23 DIAGNOSIS — Z79.890 HORMONE REPLACEMENT THERAPY: ICD-10-CM

## 2025-04-23 DIAGNOSIS — R74.8 ELEVATED ALKALINE PHOSPHATASE LEVEL: ICD-10-CM

## 2025-04-23 DIAGNOSIS — F33.1 MODERATE EPISODE OF RECURRENT MAJOR DEPRESSIVE DISORDER (HCC): ICD-10-CM

## 2025-04-23 DIAGNOSIS — F41.9 ANXIETY DISORDER, UNSPECIFIED TYPE: ICD-10-CM

## 2025-04-23 DIAGNOSIS — Z12.31 ENCOUNTER FOR SCREENING MAMMOGRAM FOR MALIGNANT NEOPLASM OF BREAST: ICD-10-CM

## 2025-04-23 DIAGNOSIS — G47.00 INSOMNIA, UNSPECIFIED TYPE: ICD-10-CM

## 2025-04-23 DIAGNOSIS — Z00.00 INITIAL MEDICARE ANNUAL WELLNESS VISIT: Primary | ICD-10-CM

## 2025-04-23 PROCEDURE — G8419 CALC BMI OUT NRM PARAM NOF/U: HCPCS | Performed by: FAMILY MEDICINE

## 2025-04-23 PROCEDURE — 3017F COLORECTAL CA SCREEN DOC REV: CPT | Performed by: FAMILY MEDICINE

## 2025-04-23 PROCEDURE — G0438 PPPS, INITIAL VISIT: HCPCS | Performed by: FAMILY MEDICINE

## 2025-04-23 PROCEDURE — 99214 OFFICE O/P EST MOD 30 MIN: CPT | Performed by: FAMILY MEDICINE

## 2025-04-23 PROCEDURE — 1036F TOBACCO NON-USER: CPT | Performed by: FAMILY MEDICINE

## 2025-04-23 PROCEDURE — G8427 DOCREV CUR MEDS BY ELIG CLIN: HCPCS | Performed by: FAMILY MEDICINE

## 2025-04-23 SDOH — ECONOMIC STABILITY: FOOD INSECURITY: WITHIN THE PAST 12 MONTHS, YOU WORRIED THAT YOUR FOOD WOULD RUN OUT BEFORE YOU GOT MONEY TO BUY MORE.: OFTEN TRUE

## 2025-04-23 SDOH — ECONOMIC STABILITY: FOOD INSECURITY: WITHIN THE PAST 12 MONTHS, THE FOOD YOU BOUGHT JUST DIDN'T LAST AND YOU DIDN'T HAVE MONEY TO GET MORE.: OFTEN TRUE

## 2025-04-23 ASSESSMENT — PATIENT HEALTH QUESTIONNAIRE - PHQ9
5. POOR APPETITE OR OVEREATING: NEARLY EVERY DAY
1. LITTLE INTEREST OR PLEASURE IN DOING THINGS: SEVERAL DAYS
SUM OF ALL RESPONSES TO PHQ QUESTIONS 1-9: 17
4. FEELING TIRED OR HAVING LITTLE ENERGY: NEARLY EVERY DAY
7. TROUBLE CONCENTRATING ON THINGS, SUCH AS READING THE NEWSPAPER OR WATCHING TELEVISION: MORE THAN HALF THE DAYS
SUM OF ALL RESPONSES TO PHQ QUESTIONS 1-9: 17
9. THOUGHTS THAT YOU WOULD BE BETTER OFF DEAD, OR OF HURTING YOURSELF: NOT AT ALL
SUM OF ALL RESPONSES TO PHQ QUESTIONS 1-9: 17
SUM OF ALL RESPONSES TO PHQ QUESTIONS 1-9: 3
2. FEELING DOWN, DEPRESSED OR HOPELESS: MORE THAN HALF THE DAYS
6. FEELING BAD ABOUT YOURSELF - OR THAT YOU ARE A FAILURE OR HAVE LET YOURSELF OR YOUR FAMILY DOWN: MORE THAN HALF THE DAYS
SUM OF ALL RESPONSES TO PHQ QUESTIONS 1-9: 3
SUM OF ALL RESPONSES TO PHQ QUESTIONS 1-9: 3
3. TROUBLE FALLING OR STAYING ASLEEP: NEARLY EVERY DAY
1. LITTLE INTEREST OR PLEASURE IN DOING THINGS: SEVERAL DAYS
SUM OF ALL RESPONSES TO PHQ QUESTIONS 1-9: 17
SUM OF ALL RESPONSES TO PHQ QUESTIONS 1-9: 3
8. MOVING OR SPEAKING SO SLOWLY THAT OTHER PEOPLE COULD HAVE NOTICED. OR THE OPPOSITE, BEING SO FIGETY OR RESTLESS THAT YOU HAVE BEEN MOVING AROUND A LOT MORE THAN USUAL: SEVERAL DAYS
10. IF YOU CHECKED OFF ANY PROBLEMS, HOW DIFFICULT HAVE THESE PROBLEMS MADE IT FOR YOU TO DO YOUR WORK, TAKE CARE OF THINGS AT HOME, OR GET ALONG WITH OTHER PEOPLE: VERY DIFFICULT
2. FEELING DOWN, DEPRESSED OR HOPELESS: MORE THAN HALF THE DAYS

## 2025-04-23 ASSESSMENT — ANXIETY QUESTIONNAIRES
3. WORRYING TOO MUCH ABOUT DIFFERENT THINGS: MORE THAN HALF THE DAYS
IF YOU CHECKED OFF ANY PROBLEMS ON THIS QUESTIONNAIRE, HOW DIFFICULT HAVE THESE PROBLEMS MADE IT FOR YOU TO DO YOUR WORK, TAKE CARE OF THINGS AT HOME, OR GET ALONG WITH OTHER PEOPLE: VERY DIFFICULT
2. NOT BEING ABLE TO STOP OR CONTROL WORRYING: SEVERAL DAYS
GAD7 TOTAL SCORE: 14
1. FEELING NERVOUS, ANXIOUS, OR ON EDGE: MORE THAN HALF THE DAYS
5. BEING SO RESTLESS THAT IT IS HARD TO SIT STILL: SEVERAL DAYS
4. TROUBLE RELAXING: MORE THAN HALF THE DAYS
7. FEELING AFRAID AS IF SOMETHING AWFUL MIGHT HAPPEN: NEARLY EVERY DAY
6. BECOMING EASILY ANNOYED OR IRRITABLE: NEARLY EVERY DAY

## 2025-04-23 NOTE — PATIENT INSTRUCTIONS
appt 9/8/17  Labs 9/11/17  Refill 10/26/16      Refilled per Dr Wang          
yourself. This person is called a health care agent (health care proxy, health care surrogate). The form is also called a durable power of  for health care.  If you do not have an advance directive, decisions about your medical care may be made by a family member, or by a doctor or a  who doesn't know you.  It may help to think of an advance directive as a gift to the people who care for you. If you have one, they won't have to make tough decisions by themselves.  For more information, including forms for your state, see the [employer] website (www.caringinfo.org/planning/advance-directives/).  Follow-up care is a key part of your treatment and safety. Be sure to make and go to all appointments, and call your doctor if you are having problems. It's also a good idea to know your test results and keep a list of the medicines you take.  What should you include in an advance directive?  Many states have a unique advance directive form. (It may ask you to address specific issues.) Or you might use a universal form that's approved by many states.  If your form doesn't tell you what to address, it may be hard to know what to include in your advance directive. Use the questions below to help you get started.  Who do you want to make decisions about your medical care if you are not able to?  What life-support measures do you want if you have a serious illness that gets worse over time or can't be cured?  What are you most afraid of that might happen? (Maybe you're afraid of having pain, losing your independence, or being kept alive by machines.)  Where would you prefer to die? (Your home? A hospital? A nursing home?)  Do you want to donate your organs when you die?  Do you want certain Hindu practices performed before you die?  When should you call for help?  Be sure to contact your doctor if you have any questions.  Where can you learn more?  Go to https://www.healthwise.net/patientEd and enter R264 to

## 2025-04-23 NOTE — PROGRESS NOTES
Medicare Annual Wellness Visit    Susan Trevino is here for Medicare AWV (Patient is coming in for a medicare wellness and medication refills. Patient states she has been feeling with very low energy for the past few months. No other concerns. )    Assessment & Plan   Initial Medicare annual wellness visit  Health Maintenance Due   Topic Date Due    HIV screen  Never done    Hepatitis B vaccine (1 of 3 - 19+ 3-dose series) Never done    Pneumococcal 50+ years Vaccine (1 of 1 - PCV) Never done    Shingles vaccine (2 of 2) 12/18/2021    COVID-19 Vaccine (5 - 2024-25 season) 09/01/2024    Breast cancer screen  04/14/2025     Anxiety disorder, unspecified type  Moderate episode of recurrent major depressive disorder (HCC)  -     buPROPion (WELLBUTRIN XL) 300 MG extended release tablet  -     venlafaxine (EFFEXOR XR) 150 MG extended release capsule  -     venlafaxine (EFFEXOR XR) 75 MG extended release capsule      Hot flashes  Hormone replacement therapy - Much improved with HRT. No vaginal bleeding/spotting. Due for annual mammogram, ordered.   -     esterified estrogens (MENEST) 0.625 MG TABS tablet  -     progesterone (PROMETRIUM) 200 MG CAPS capsule    Seasonal allergies - Worse this season, has not tried OTC yet.   -     cetirizine (ZYRTEC) 10 MG tablet  -     azelastine (ASTELIN) 0.1 % nasal spray    Elevated alkaline phosphatase level - Noted on prior labs, rechecking today. GGT has been normal. If remains elevated today, would consider work-up for Paget's Diease.   -     Comprehensive Metabolic Panel; Future  -     CBC with Auto Differential; Future  -     Lipid Panel; Future  -     Hemoglobin A1C; Future    Vitamin D deficiency  -     Vitamin D 25 Hydroxy; Future    Abnormal lead level in blood  -     Hemoglobin A1C; Future        Return in about 6 months (around 10/23/2025) for chronic conditions .       Chief Complaint   Patient presents with    Medicare AWV     Patient is coming in for a medicare

## 2025-04-23 NOTE — PROGRESS NOTES
Susan Trevino is a 54 y.o. female    Identified pt with two pt identifiers(name and ). Reviewed record in preparation for visit and have obtained necessary documentation.    Chief Complaint   Patient presents with    Medicare AWV     Patient is coming in for a medicare wellness and medication refills. Patient states she has been feeling with very low energy for the past few months. No other concerns.        \"Have you been to the ER, urgent care clinic since your last visit?  Hospitalized since your last visit?\"    NO    “Have you seen or consulted any other health care providers outside of [facility] since your last visit?”    NO        Vitals:    25 1003   BP: 115/72   BP Site: Right Upper Arm   Patient Position: Sitting   BP Cuff Size: Small Adult   Pulse: 61   Resp: 18   Temp: 98.2 °F (36.8 °C)   TempSrc: Oral   SpO2: 95%   Weight: 64.9 kg (143 lb)   Height: 1.6 m (5' 3\")            Health Maintenance Due   Topic Date Due    HIV screen  Never done    Hepatitis B vaccine (1 of 3 - 19+ 3-dose series) Never done    Pneumococcal 50+ years Vaccine (1 of 1 - PCV) Never done    Shingles vaccine (2 of 2) 2021    COVID-19 Vaccine (2024- season) 2024    Breast cancer screen  2025       Click Here for Release of Records Request     Medication Reconciliation completed, changes noted.  Please  Update medication list.

## 2025-04-24 LAB
25(OH)D3 SERPL-MCNC: 95.7 NG/ML (ref 30–100)
ALBUMIN SERPL-MCNC: 3.8 G/DL (ref 3.5–5)
ALBUMIN/GLOB SERPL: 1.1 (ref 1.1–2.2)
ALP SERPL-CCNC: 114 U/L (ref 45–117)
ALT SERPL-CCNC: 25 U/L (ref 12–78)
ANION GAP SERPL CALC-SCNC: 4 MMOL/L (ref 2–12)
AST SERPL-CCNC: 15 U/L (ref 15–37)
BASOPHILS # BLD: 0.06 K/UL (ref 0–0.1)
BASOPHILS NFR BLD: 0.6 % (ref 0–1)
BILIRUB SERPL-MCNC: 0.2 MG/DL (ref 0.2–1)
BUN SERPL-MCNC: 10 MG/DL (ref 6–20)
BUN/CREAT SERPL: 12 (ref 12–20)
CALCIUM SERPL-MCNC: 9.3 MG/DL (ref 8.5–10.1)
CHLORIDE SERPL-SCNC: 100 MMOL/L (ref 97–108)
CHOLEST SERPL-MCNC: 215 MG/DL
CO2 SERPL-SCNC: 32 MMOL/L (ref 21–32)
CREAT SERPL-MCNC: 0.86 MG/DL (ref 0.55–1.02)
DIFFERENTIAL METHOD BLD: NORMAL
EOSINOPHIL # BLD: 0.2 K/UL (ref 0–0.4)
EOSINOPHIL NFR BLD: 2.1 % (ref 0–7)
ERYTHROCYTE [DISTWIDTH] IN BLOOD BY AUTOMATED COUNT: 12.5 % (ref 11.5–14.5)
EST. AVERAGE GLUCOSE BLD GHB EST-MCNC: 108 MG/DL
GLOBULIN SER CALC-MCNC: 3.4 G/DL (ref 2–4)
GLUCOSE SERPL-MCNC: 82 MG/DL (ref 65–100)
HBA1C MFR BLD: 5.4 % (ref 4–5.6)
HCT VFR BLD AUTO: 41.3 % (ref 35–47)
HDLC SERPL-MCNC: 42 MG/DL
HDLC SERPL: 5.1 (ref 0–5)
HGB BLD-MCNC: 13.3 G/DL (ref 11.5–16)
IMM GRANULOCYTES # BLD AUTO: 0.03 K/UL (ref 0–0.04)
IMM GRANULOCYTES NFR BLD AUTO: 0.3 % (ref 0–0.5)
LDLC SERPL CALC-MCNC: 104.6 MG/DL (ref 0–100)
LYMPHOCYTES # BLD: 1.92 K/UL (ref 0.8–3.5)
LYMPHOCYTES NFR BLD: 19.9 % (ref 12–49)
MCH RBC QN AUTO: 30.9 PG (ref 26–34)
MCHC RBC AUTO-ENTMCNC: 32.2 G/DL (ref 30–36.5)
MCV RBC AUTO: 95.8 FL (ref 80–99)
MONOCYTES # BLD: 0.69 K/UL (ref 0–1)
MONOCYTES NFR BLD: 7.2 % (ref 5–13)
NEUTS SEG # BLD: 6.74 K/UL (ref 1.8–8)
NEUTS SEG NFR BLD: 69.9 % (ref 32–75)
NRBC # BLD: 0 K/UL (ref 0–0.01)
NRBC BLD-RTO: 0 PER 100 WBC
PLATELET # BLD AUTO: 288 K/UL (ref 150–400)
PMV BLD AUTO: 10.6 FL (ref 8.9–12.9)
POTASSIUM SERPL-SCNC: 4.4 MMOL/L (ref 3.5–5.1)
PROT SERPL-MCNC: 7.2 G/DL (ref 6.4–8.2)
RBC # BLD AUTO: 4.31 M/UL (ref 3.8–5.2)
SODIUM SERPL-SCNC: 136 MMOL/L (ref 136–145)
TRIGL SERPL-MCNC: 342 MG/DL
VLDLC SERPL CALC-MCNC: 68.4 MG/DL
WBC # BLD AUTO: 9.6 K/UL (ref 3.6–11)

## 2025-05-09 ENCOUNTER — RESULTS FOLLOW-UP (OUTPATIENT)
Age: 55
End: 2025-05-09

## 2025-05-09 RX ORDER — AZELASTINE 1 MG/ML
1 SPRAY, METERED NASAL 2 TIMES DAILY
Qty: 60 ML | Refills: 1 | Status: SHIPPED | OUTPATIENT
Start: 2025-05-09

## 2025-05-09 RX ORDER — TRAZODONE HYDROCHLORIDE 50 MG/1
50-100 TABLET ORAL NIGHTLY
Qty: 90 TABLET | Refills: 0 | Status: SHIPPED | OUTPATIENT
Start: 2025-05-09

## 2025-05-09 RX ORDER — VENLAFAXINE HYDROCHLORIDE 150 MG/1
150 CAPSULE, EXTENDED RELEASE ORAL DAILY
Qty: 90 CAPSULE | Refills: 1 | Status: SHIPPED | OUTPATIENT
Start: 2025-05-09

## 2025-05-09 RX ORDER — ESTERIFIED ESTROGENS 0.62 MG/1
0.62 TABLET, FILM COATED ORAL DAILY
Qty: 90 TABLET | Refills: 1 | Status: SHIPPED | OUTPATIENT
Start: 2025-05-09

## 2025-05-09 RX ORDER — VENLAFAXINE HYDROCHLORIDE 75 MG/1
75 CAPSULE, EXTENDED RELEASE ORAL DAILY
Qty: 90 CAPSULE | Refills: 1 | Status: SHIPPED | OUTPATIENT
Start: 2025-05-09

## 2025-05-09 RX ORDER — BUPROPION HYDROCHLORIDE 300 MG/1
300 TABLET ORAL DAILY
Qty: 90 TABLET | Refills: 1 | Status: SHIPPED | OUTPATIENT
Start: 2025-05-09

## 2025-05-09 RX ORDER — CETIRIZINE HYDROCHLORIDE 10 MG/1
10 TABLET ORAL DAILY
Qty: 90 TABLET | Refills: 0 | Status: SHIPPED | OUTPATIENT
Start: 2025-05-09

## 2025-05-09 RX ORDER — PROGESTERONE 200 MG/1
CAPSULE ORAL
Qty: 36 CAPSULE | Refills: 1 | Status: SHIPPED | OUTPATIENT
Start: 2025-05-09

## 2025-05-12 ENCOUNTER — HOSPITAL ENCOUNTER (OUTPATIENT)
Facility: HOSPITAL | Age: 55
Discharge: HOME | End: 2025-05-15
Attending: FAMILY MEDICINE
Payer: MEDICARE

## 2025-05-12 VITALS — HEIGHT: 63 IN | BODY MASS INDEX: 25.34 KG/M2 | WEIGHT: 143 LBS

## 2025-05-12 DIAGNOSIS — Z12.31 ENCOUNTER FOR SCREENING MAMMOGRAM FOR MALIGNANT NEOPLASM OF BREAST: ICD-10-CM

## 2025-05-12 PROCEDURE — 77067 SCR MAMMO BI INCL CAD: CPT

## 2025-05-12 PROCEDURE — 77063 BREAST TOMOSYNTHESIS BI: CPT

## 2025-05-13 ENCOUNTER — RESULTS FOLLOW-UP (OUTPATIENT)
Age: 55
End: 2025-05-13

## 2025-06-15 DIAGNOSIS — G47.00 INSOMNIA, UNSPECIFIED TYPE: ICD-10-CM

## 2025-06-16 NOTE — TELEPHONE ENCOUNTER
Medication Refill Request    Susan Trevino is requesting a refill of the following medication(s):   Requested Prescriptions     Pending Prescriptions Disp Refills    traZODone (DESYREL) 50 MG tablet [Pharmacy Med Name: TRAZODONE 50 MG TABLET] 180 tablet 1     Sig: TAKE 1 TO 2 TABLETS BY MOUTH NIGHTLY        Listed PCP is Litzy Justice, DO   Last provider to prescribe medication: Vinh  Last Date of Medication Prescribed: 05/09/2025  Date of Last Office Visit at [facility]: 04/23/2025  FUTURE APPOINTMENT: No future appointments.    Please send refill to:    [facility]/pharmacy #[id number] [place], [place] - [address] [facility] -  [phone] - F [phone]  [address] [place] [id number]  Phone: [phone] Fax: [phone]    Please review request and approve or deny with recommendations.

## 2025-06-18 RX ORDER — TRAZODONE HYDROCHLORIDE 50 MG/1
50-100 TABLET ORAL NIGHTLY
Qty: 180 TABLET | Refills: 0 | Status: SHIPPED | OUTPATIENT
Start: 2025-06-18

## 2025-06-18 NOTE — TELEPHONE ENCOUNTER
Sent in 90-day supply of trazodone for patient. Patient needs follow-up appointment for additional refills. Please call to get her scheduled in July/August. Thanks!

## 2025-07-23 ENCOUNTER — OFFICE VISIT (OUTPATIENT)
Age: 55
End: 2025-07-23
Payer: MEDICARE

## 2025-07-23 VITALS
HEIGHT: 63 IN | DIASTOLIC BLOOD PRESSURE: 61 MMHG | SYSTOLIC BLOOD PRESSURE: 93 MMHG | HEART RATE: 69 BPM | OXYGEN SATURATION: 95 % | BODY MASS INDEX: 26.4 KG/M2 | RESPIRATION RATE: 18 BRPM | WEIGHT: 149 LBS | TEMPERATURE: 98.4 F

## 2025-07-23 DIAGNOSIS — G47.00 INSOMNIA, UNSPECIFIED TYPE: ICD-10-CM

## 2025-07-23 DIAGNOSIS — N95.1 MENOPAUSAL VASOMOTOR SYNDROME: Primary | ICD-10-CM

## 2025-07-23 DIAGNOSIS — Z71.3 ENCOUNTER FOR WEIGHT LOSS COUNSELING: ICD-10-CM

## 2025-07-23 DIAGNOSIS — F41.9 ANXIETY DISORDER, UNSPECIFIED TYPE: ICD-10-CM

## 2025-07-23 DIAGNOSIS — F33.1 MODERATE EPISODE OF RECURRENT MAJOR DEPRESSIVE DISORDER (HCC): ICD-10-CM

## 2025-07-23 PROBLEM — R74.8 ELEVATED ALKALINE PHOSPHATASE LEVEL: Status: RESOLVED | Noted: 2024-04-16 | Resolved: 2025-07-23

## 2025-07-23 PROCEDURE — G8427 DOCREV CUR MEDS BY ELIG CLIN: HCPCS | Performed by: FAMILY MEDICINE

## 2025-07-23 PROCEDURE — 99214 OFFICE O/P EST MOD 30 MIN: CPT | Performed by: FAMILY MEDICINE

## 2025-07-23 PROCEDURE — G8419 CALC BMI OUT NRM PARAM NOF/U: HCPCS | Performed by: FAMILY MEDICINE

## 2025-07-23 PROCEDURE — 3017F COLORECTAL CA SCREEN DOC REV: CPT | Performed by: FAMILY MEDICINE

## 2025-07-23 PROCEDURE — 1036F TOBACCO NON-USER: CPT | Performed by: FAMILY MEDICINE

## 2025-07-23 RX ORDER — BUPROPION HYDROCHLORIDE 150 MG/1
150 TABLET ORAL DAILY
Qty: 90 TABLET | Refills: 1 | Status: SHIPPED | OUTPATIENT
Start: 2025-07-23

## 2025-07-23 RX ORDER — TRAZODONE HYDROCHLORIDE 50 MG/1
50 TABLET ORAL NIGHTLY
Qty: 90 TABLET | Refills: 1 | Status: SHIPPED | OUTPATIENT
Start: 2025-07-23

## 2025-07-23 ASSESSMENT — PATIENT HEALTH QUESTIONNAIRE - PHQ9
SUM OF ALL RESPONSES TO PHQ QUESTIONS 1-9: 14
3. TROUBLE FALLING OR STAYING ASLEEP: SEVERAL DAYS
SUM OF ALL RESPONSES TO PHQ QUESTIONS 1-9: 14
SUM OF ALL RESPONSES TO PHQ QUESTIONS 1-9: 3
2. FEELING DOWN, DEPRESSED OR HOPELESS: SEVERAL DAYS
SUM OF ALL RESPONSES TO PHQ QUESTIONS 1-9: 3
10. IF YOU CHECKED OFF ANY PROBLEMS, HOW DIFFICULT HAVE THESE PROBLEMS MADE IT FOR YOU TO DO YOUR WORK, TAKE CARE OF THINGS AT HOME, OR GET ALONG WITH OTHER PEOPLE: SOMEWHAT DIFFICULT
4. FEELING TIRED OR HAVING LITTLE ENERGY: NEARLY EVERY DAY
5. POOR APPETITE OR OVEREATING: MORE THAN HALF THE DAYS
8. MOVING OR SPEAKING SO SLOWLY THAT OTHER PEOPLE COULD HAVE NOTICED. OR THE OPPOSITE, BEING SO FIGETY OR RESTLESS THAT YOU HAVE BEEN MOVING AROUND A LOT MORE THAN USUAL: NOT AT ALL
SUM OF ALL RESPONSES TO PHQ QUESTIONS 1-9: 14
SUM OF ALL RESPONSES TO PHQ QUESTIONS 1-9: 14
7. TROUBLE CONCENTRATING ON THINGS, SUCH AS READING THE NEWSPAPER OR WATCHING TELEVISION: NEARLY EVERY DAY
2. FEELING DOWN, DEPRESSED OR HOPELESS: SEVERAL DAYS
6. FEELING BAD ABOUT YOURSELF - OR THAT YOU ARE A FAILURE OR HAVE LET YOURSELF OR YOUR FAMILY DOWN: MORE THAN HALF THE DAYS
SUM OF ALL RESPONSES TO PHQ QUESTIONS 1-9: 3
1. LITTLE INTEREST OR PLEASURE IN DOING THINGS: MORE THAN HALF THE DAYS
SUM OF ALL RESPONSES TO PHQ QUESTIONS 1-9: 3
9. THOUGHTS THAT YOU WOULD BE BETTER OFF DEAD, OR OF HURTING YOURSELF: NOT AT ALL
1. LITTLE INTEREST OR PLEASURE IN DOING THINGS: MORE THAN HALF THE DAYS

## 2025-07-23 NOTE — PROGRESS NOTES
Susan Trevino is a 55 y.o. female    Identified pt with two pt identifiers(name and ). Reviewed record in preparation for visit and have obtained necessary documentation.    Chief Complaint   Patient presents with    Medication Refill     Patient is coming in for a follow up to get further medication refills. No other concerns,        \"Have you been to the ER, urgent care clinic since your last visit?  Hospitalized since your last visit?\"    NO    “Have you seen or consulted any other health care providers outside of [facility] since your last visit?”    NO              Vitals:    25 1306   BP: 93/61   BP Site: Right Upper Arm   Patient Position: Sitting   BP Cuff Size: Small Adult   Pulse: 69   Resp: 18   Temp: 98.4 °F (36.9 °C)   TempSrc: Oral   SpO2: 95%   Weight: 67.6 kg (149 lb)   Height: 1.6 m (5' 3\")            Health Maintenance Due   Topic Date Due    HIV screen  Never done    Hepatitis B vaccine (1 of 3 - 19+ 3-dose series) Never done    Pneumococcal 50+ years Vaccine (1 of 1 - PCV) Never done    Shingles vaccine (2 of 2) 2021    COVID-19 Vaccine ( season) 2024       Click Here for Release of Records Request     Medication Reconciliation completed, changes noted.  Please  Update medication list.

## 2025-07-23 NOTE — PROGRESS NOTES
[facility] Office Visit     Assessment/ Plan: Susan Trevino is a 55 y.o. female presenting for:  Assessment & Plan  1. Menopausal vasomotor syndrome.  - Hormones effective, no vaginal bleeding or spotting.  - Mammogram 04/2025 normal.  - Continue current hormone therapy.    2. Anxiety.  - Improved mood on current medication.  - Higher bupropion dose may affect sleep and hot flashes.  - Prescribe lower bupropion dose; halve current tablets until new prescription.  - Inform provider if condition worsens after dose reduction.    3. Chronic pain.  - Managed with current medications, follows with outside provider.  - No changes to regimen.  - No new symptoms.    4. Insomnia.  - Trazodone taken 3-4 days a week, effective.  - Provide trazodone prescription for 6 months.    5. Weight management.  - Recent weight gain, sluggishness.  - Limit processed foods and sugars.  - Aim for 150 minutes of aerobic activity per week, muscle strengthening twice a week.  - Suggest protein powder, 20 g per meal.  - Fasting cholesterol test at next follow-up.    Follow-up: After the first of the year.       20 minutes were spent on the day of this encounter both with the patient and in related activities including chart review, care coordination and counseling.     Patient instructions were discussed and/or provided in the AVS. The patient understands and agrees to the plan.    RETURN TO CARE: 6 months   No future appointments.    Subjective:  Chief Complaint   Patient presents with    Medication Refill     Patient is coming in for a follow up to get further medication refills. No other concerns,      Mrs. Trevino is a 56yo female with PMH of anxiety, chronic pain, menopausal vasomotor syndrome on HRT here for med refills.     - vaginal bleeding?  mammo 5/25   - adjust bupropion or venlafaxine?     Subjective   History of Present Illness  The patient is a 55-year-old female with anxiety, chronic pain, and

## 2025-08-04 DIAGNOSIS — J30.2 SEASONAL ALLERGIES: ICD-10-CM

## 2025-08-04 RX ORDER — CETIRIZINE HYDROCHLORIDE 10 MG/1
10 TABLET ORAL DAILY
Qty: 90 TABLET | Refills: 3 | Status: SHIPPED | OUTPATIENT
Start: 2025-08-04